# Patient Record
Sex: MALE | Race: WHITE | URBAN - METROPOLITAN AREA
[De-identification: names, ages, dates, MRNs, and addresses within clinical notes are randomized per-mention and may not be internally consistent; named-entity substitution may affect disease eponyms.]

---

## 2018-12-20 PROBLEM — S72.112A CLOSED FRACTURE OF GREATER TROCHANTER OF LEFT FEMUR (HCC): Status: ACTIVE | Noted: 2018-06-26

## 2018-12-20 PROBLEM — R07.9 CHEST PAIN: Status: ACTIVE | Noted: 2018-12-20

## 2018-12-20 PROBLEM — I25.10 CHRONIC CORONARY ARTERY DISEASE: Status: ACTIVE | Noted: 2018-12-20

## 2018-12-20 PROBLEM — Z95.5 PRESENCE OF STENT IN CORONARY ARTERY: Status: ACTIVE | Noted: 2018-04-23

## 2018-12-20 PROBLEM — E78.5 HYPERLIPIDEMIA: Status: ACTIVE | Noted: 2018-12-20

## 2018-12-20 PROBLEM — Z87.39 HX OF DEGENERATIVE DISC DISEASE: Status: ACTIVE | Noted: 2018-12-20

## 2018-12-20 PROBLEM — R00.1 BRADYCARDIA: Status: ACTIVE | Noted: 2018-01-24

## 2019-10-29 PROBLEM — I25.10 CHRONIC CORONARY ARTERY DISEASE: Status: RESOLVED | Noted: 2018-12-20 | Resolved: 2019-10-29

## 2019-10-29 PROBLEM — Z79.02 ENCOUNTER FOR CURRENT LONG TERM USE OF ANTIPLATELET DRUG: Status: ACTIVE | Noted: 2019-10-29

## 2023-09-28 ENCOUNTER — HOSPITAL ENCOUNTER (OUTPATIENT)
Age: 73
Setting detail: SPECIMEN
Discharge: HOME | End: 2023-09-28

## 2023-09-28 LAB
ALBUMIN SERPL-MCNC: 3.6 G/DL (ref 3.5–5.2)
ALBUMIN/GLOB SERPL: 1.3 {RATIO} (ref 1–2.5)
ALP SERPL-CCNC: 96 U/L (ref 40–129)
ALT SERPL-CCNC: 30 U/L (ref 5–41)
ANION GAP SERPL CALCULATED.3IONS-SCNC: 10 MMOL/L (ref 9–17)
AST SERPL-CCNC: 22 U/L
BILIRUB SERPL-MCNC: 0.4 MG/DL (ref 0.3–1.2)
BUN SERPL-MCNC: 31 MG/DL (ref 8–23)
CALCIUM SERPL-MCNC: 9 MG/DL (ref 8.6–10.4)
CHLORIDE SERPL-SCNC: 102 MMOL/L (ref 98–107)
CHOLEST SERPL-MCNC: 73 MG/DL
CHOLESTEROL/HDL RATIO: 1.8
CO2 SERPL-SCNC: 21 MMOL/L (ref 20–31)
CREAT SERPL-MCNC: 0.8 MG/DL (ref 0.7–1.2)
EST. AVERAGE GLUCOSE BLD GHB EST-MCNC: 157 MG/DL
GFR SERPL CREATININE-BSD FRML MDRD: >60 ML/MIN/1.73M2
GLUCOSE SERPL-MCNC: 135 MG/DL (ref 70–99)
HBA1C MFR BLD: 7.1 % (ref 4–6)
HDLC SERPL-MCNC: 41 MG/DL
LDLC SERPL CALC-MCNC: 21 MG/DL (ref 0–130)
POTASSIUM SERPL-SCNC: 5.3 MMOL/L (ref 3.7–5.3)
PROT SERPL-MCNC: 6.4 G/DL (ref 6.4–8.3)
SODIUM SERPL-SCNC: 133 MMOL/L (ref 135–144)
TRIGL SERPL-MCNC: 54 MG/DL

## 2023-10-07 VITALS
BODY MASS INDEX: 30.5 KG/M2 | WEIGHT: 189.8 LBS | SYSTOLIC BLOOD PRESSURE: 138 MMHG | RESPIRATION RATE: 16 BRPM | TEMPERATURE: 97.2 F | DIASTOLIC BLOOD PRESSURE: 82 MMHG | HEIGHT: 66 IN | HEART RATE: 62 BPM

## 2023-10-07 RX ORDER — CEPHALEXIN 500 MG/1
CAPSULE ORAL
COMMUNITY
Start: 2023-08-15

## 2023-10-09 ENCOUNTER — OFFICE VISIT (OUTPATIENT)
Age: 73
End: 2023-10-09

## 2023-10-09 VITALS
RESPIRATION RATE: 14 BRPM | BODY MASS INDEX: 29.95 KG/M2 | SYSTOLIC BLOOD PRESSURE: 128 MMHG | DIASTOLIC BLOOD PRESSURE: 70 MMHG | HEIGHT: 66 IN | HEART RATE: 74 BPM | TEMPERATURE: 97.6 F | WEIGHT: 186.38 LBS

## 2023-10-09 DIAGNOSIS — E78.2 MIXED HYPERLIPIDEMIA: ICD-10-CM

## 2023-10-09 DIAGNOSIS — I10 BENIGN ESSENTIAL HYPERTENSION: ICD-10-CM

## 2023-10-09 DIAGNOSIS — Z12.5 SPECIAL SCREENING FOR MALIGNANT NEOPLASM OF PROSTATE: ICD-10-CM

## 2023-10-09 DIAGNOSIS — E11.9 TYPE 2 DIABETES MELLITUS WITHOUT COMPLICATION, WITHOUT LONG-TERM CURRENT USE OF INSULIN (HCC): Primary | ICD-10-CM

## 2023-10-09 DIAGNOSIS — I25.10 CORONARY ARTERY DISEASE INVOLVING NATIVE CORONARY ARTERY OF NATIVE HEART WITHOUT ANGINA PECTORIS: ICD-10-CM

## 2023-10-09 PROBLEM — R09.81 NASAL CONGESTION: Status: ACTIVE | Noted: 2023-10-09

## 2023-10-09 PROBLEM — J32.4 CHRONIC PANSINUSITIS: Status: ACTIVE | Noted: 2023-10-09

## 2023-10-09 PROBLEM — J34.3 HYPERTROPHY OF NASAL TURBINATES: Status: ACTIVE | Noted: 2023-10-09

## 2023-10-09 RX ORDER — BUDESONIDE 0.5 MG/2ML
1 INHALANT ORAL 2 TIMES DAILY
COMMUNITY

## 2023-10-09 RX ORDER — AZELASTINE 1 MG/ML
1 SPRAY, METERED NASAL 2 TIMES DAILY
COMMUNITY

## 2023-10-09 RX ORDER — FLUTICASONE PROPIONATE 50 MCG
SPRAY, SUSPENSION (ML) NASAL
COMMUNITY

## 2023-10-09 RX ORDER — BUDESONIDE 0.5 MG/2ML
0.5 INHALANT ORAL DAILY
COMMUNITY
Start: 2023-09-05

## 2023-10-09 RX ORDER — MONTELUKAST SODIUM 10 MG/1
10 TABLET ORAL NIGHTLY
COMMUNITY

## 2023-10-09 RX ORDER — MINOCYCLINE HYDROCHLORIDE 100 MG/1
100 CAPSULE ORAL 2 TIMES DAILY
COMMUNITY

## 2023-10-09 RX ORDER — MONTELUKAST SODIUM 10 MG/1
10 TABLET ORAL DAILY
COMMUNITY
Start: 2022-01-11

## 2023-10-09 SDOH — ECONOMIC STABILITY: HOUSING INSECURITY
IN THE LAST 12 MONTHS, WAS THERE A TIME WHEN YOU DID NOT HAVE A STEADY PLACE TO SLEEP OR SLEPT IN A SHELTER (INCLUDING NOW)?: NO

## 2023-10-09 SDOH — ECONOMIC STABILITY: INCOME INSECURITY: HOW HARD IS IT FOR YOU TO PAY FOR THE VERY BASICS LIKE FOOD, HOUSING, MEDICAL CARE, AND HEATING?: NOT HARD AT ALL

## 2023-10-09 SDOH — ECONOMIC STABILITY: FOOD INSECURITY: WITHIN THE PAST 12 MONTHS, YOU WORRIED THAT YOUR FOOD WOULD RUN OUT BEFORE YOU GOT MONEY TO BUY MORE.: NEVER TRUE

## 2023-10-09 SDOH — ECONOMIC STABILITY: FOOD INSECURITY: WITHIN THE PAST 12 MONTHS, THE FOOD YOU BOUGHT JUST DIDN'T LAST AND YOU DIDN'T HAVE MONEY TO GET MORE.: NEVER TRUE

## 2023-10-09 ASSESSMENT — PATIENT HEALTH QUESTIONNAIRE - PHQ9
SUM OF ALL RESPONSES TO PHQ9 QUESTIONS 1 & 2: 0
SUM OF ALL RESPONSES TO PHQ QUESTIONS 1-9: 0
SUM OF ALL RESPONSES TO PHQ QUESTIONS 1-9: 0
1. LITTLE INTEREST OR PLEASURE IN DOING THINGS: 0
2. FEELING DOWN, DEPRESSED OR HOPELESS: 0
SUM OF ALL RESPONSES TO PHQ QUESTIONS 1-9: 0
SUM OF ALL RESPONSES TO PHQ QUESTIONS 1-9: 0

## 2023-12-11 NOTE — TELEPHONE ENCOUNTER
Laila Beltran is calling to request a refill on the following medication(s):    Medication Request:  Requested Prescriptions     Pending Prescriptions Disp Refills    metFORMIN (GLUCOPHAGE) 1000 MG tablet [Pharmacy Med Name: metFORMIN HCl Oral Tablet 1000 MG] 180 tablet 0     Sig: TAKE 1 TABLET TWICE DAILY       Last Visit Date (If Applicable):  52/5/1650    Next Visit Date:    4/15/2024

## 2024-04-05 NOTE — TELEPHONE ENCOUNTER
Nick More is calling to request a refill on the following medication(s):    Medication Request:  Requested Prescriptions     Pending Prescriptions Disp Refills    metFORMIN (GLUCOPHAGE) 1000 MG tablet [Pharmacy Med Name: metFORMIN HCl Oral Tablet 1000 MG] 180 tablet 0     Sig: TAKE ONE TABLET BY MOUTH TWICE DAILY       Last Visit Date (If Applicable):  10/9/2023    Next Visit Date:    4/15/2024

## 2024-04-08 ENCOUNTER — HOSPITAL ENCOUNTER (OUTPATIENT)
Age: 74
Setting detail: SPECIMEN
Discharge: HOME | End: 2024-04-08

## 2024-04-08 DIAGNOSIS — I10 BENIGN ESSENTIAL HYPERTENSION: ICD-10-CM

## 2024-04-08 DIAGNOSIS — Z12.5 SPECIAL SCREENING FOR MALIGNANT NEOPLASM OF PROSTATE: ICD-10-CM

## 2024-04-08 DIAGNOSIS — E78.2 MIXED HYPERLIPIDEMIA: ICD-10-CM

## 2024-04-08 DIAGNOSIS — E11.9 TYPE 2 DIABETES MELLITUS WITHOUT COMPLICATION, WITHOUT LONG-TERM CURRENT USE OF INSULIN (HCC): ICD-10-CM

## 2024-04-08 LAB
ALBUMIN SERPL-MCNC: 4.7 G/DL (ref 3.5–5.2)
ALBUMIN/GLOB SERPL: 2 {RATIO} (ref 1–2.5)
ALP SERPL-CCNC: 79 U/L (ref 40–129)
ALT SERPL-CCNC: 43 U/L (ref 10–50)
ANION GAP SERPL CALCULATED.3IONS-SCNC: 10 MMOL/L (ref 9–16)
AST SERPL-CCNC: 31 U/L (ref 10–50)
BILIRUB DIRECT SERPL-MCNC: <0.2 MG/DL (ref 0–0.3)
BILIRUB INDIRECT SERPL-MCNC: 0.2 MG/DL (ref 0–1)
BILIRUB SERPL-MCNC: 0.4 MG/DL (ref 0–1.2)
BUN SERPL-MCNC: 18 MG/DL (ref 8–23)
CALCIUM SERPL-MCNC: 10.2 MG/DL (ref 8.6–10.4)
CHLORIDE SERPL-SCNC: 101 MMOL/L (ref 98–107)
CHOLEST SERPL-MCNC: 98 MG/DL (ref 0–199)
CHOLESTEROL/HDL RATIO: 3
CO2 SERPL-SCNC: 27 MMOL/L (ref 20–31)
CREAT SERPL-MCNC: 0.9 MG/DL (ref 0.7–1.2)
EST. AVERAGE GLUCOSE BLD GHB EST-MCNC: 154 MG/DL
GFR SERPL CREATININE-BSD FRML MDRD: 88 ML/MIN/1.73M2
GLUCOSE SERPL-MCNC: 176 MG/DL (ref 74–99)
HBA1C MFR BLD: 7 % (ref 4–6)
HDLC SERPL-MCNC: 38 MG/DL
LDLC SERPL CALC-MCNC: 41 MG/DL (ref 0–100)
POTASSIUM SERPL-SCNC: 5.3 MMOL/L (ref 3.7–5.3)
PROT SERPL-MCNC: 6.9 G/DL (ref 6.6–8.7)
PSA SERPL-MCNC: 1.2 NG/ML (ref 0–4)
SODIUM SERPL-SCNC: 138 MMOL/L (ref 136–145)
TRIGL SERPL-MCNC: 92 MG/DL
VLDLC SERPL CALC-MCNC: 18 MG/DL

## 2024-04-15 ENCOUNTER — OFFICE VISIT (OUTPATIENT)
Age: 74
End: 2024-04-15
Payer: MEDICARE

## 2024-04-15 VITALS
HEIGHT: 66 IN | OXYGEN SATURATION: 97 % | DIASTOLIC BLOOD PRESSURE: 80 MMHG | WEIGHT: 191 LBS | SYSTOLIC BLOOD PRESSURE: 140 MMHG | HEART RATE: 51 BPM | TEMPERATURE: 98 F | BODY MASS INDEX: 30.7 KG/M2

## 2024-04-15 DIAGNOSIS — I10 BENIGN ESSENTIAL HYPERTENSION: ICD-10-CM

## 2024-04-15 DIAGNOSIS — J06.9 URI, ACUTE: ICD-10-CM

## 2024-04-15 DIAGNOSIS — J30.1 SEASONAL ALLERGIC RHINITIS DUE TO POLLEN: ICD-10-CM

## 2024-04-15 DIAGNOSIS — E78.2 MIXED HYPERLIPIDEMIA: ICD-10-CM

## 2024-04-15 DIAGNOSIS — E11.9 TYPE 2 DIABETES MELLITUS WITHOUT COMPLICATION, WITHOUT LONG-TERM CURRENT USE OF INSULIN (HCC): ICD-10-CM

## 2024-04-15 DIAGNOSIS — I25.10 CORONARY ARTERY DISEASE INVOLVING NATIVE CORONARY ARTERY OF NATIVE HEART WITHOUT ANGINA PECTORIS: Primary | ICD-10-CM

## 2024-04-15 PROCEDURE — 96372 THER/PROPH/DIAG INJ SC/IM: CPT | Performed by: FAMILY MEDICINE

## 2024-04-15 PROCEDURE — 3079F DIAST BP 80-89 MM HG: CPT | Performed by: FAMILY MEDICINE

## 2024-04-15 PROCEDURE — 3077F SYST BP >= 140 MM HG: CPT | Performed by: FAMILY MEDICINE

## 2024-04-15 PROCEDURE — 99213 OFFICE O/P EST LOW 20 MIN: CPT | Performed by: FAMILY MEDICINE

## 2024-04-15 PROCEDURE — 1123F ACP DISCUSS/DSCN MKR DOCD: CPT | Performed by: FAMILY MEDICINE

## 2024-04-15 PROCEDURE — 3051F HG A1C>EQUAL 7.0%<8.0%: CPT | Performed by: FAMILY MEDICINE

## 2024-04-15 RX ORDER — TRIAMCINOLONE ACETONIDE 40 MG/ML
40 INJECTION, SUSPENSION INTRA-ARTICULAR; INTRAMUSCULAR ONCE
Status: COMPLETED | OUTPATIENT
Start: 2024-04-15 | End: 2024-04-15

## 2024-04-15 RX ORDER — IBUPROFEN 200 MG
200 TABLET ORAL EVERY 6 HOURS PRN
COMMUNITY

## 2024-04-15 RX ORDER — CEPHALEXIN 500 MG/1
500 CAPSULE ORAL 3 TIMES DAILY
Qty: 30 CAPSULE | Refills: 0 | Status: SHIPPED | OUTPATIENT
Start: 2024-04-15 | End: 2024-04-25

## 2024-04-15 RX ADMIN — TRIAMCINOLONE ACETONIDE 40 MG: 40 INJECTION, SUSPENSION INTRA-ARTICULAR; INTRAMUSCULAR at 08:52

## 2024-04-15 ASSESSMENT — PATIENT HEALTH QUESTIONNAIRE - PHQ9
SUM OF ALL RESPONSES TO PHQ QUESTIONS 1-9: 0
SUM OF ALL RESPONSES TO PHQ QUESTIONS 1-9: 0
1. LITTLE INTEREST OR PLEASURE IN DOING THINGS: NOT AT ALL
SUM OF ALL RESPONSES TO PHQ QUESTIONS 1-9: 0
SUM OF ALL RESPONSES TO PHQ QUESTIONS 1-9: 0
2. FEELING DOWN, DEPRESSED OR HOPELESS: NOT AT ALL
SUM OF ALL RESPONSES TO PHQ9 QUESTIONS 1 & 2: 0

## 2024-04-15 NOTE — PROGRESS NOTES
After obtaining consent, and per orders of Dr. GWYN Mora, injection of Kenalog 40 mg given in Left deltoid by Kary Dewey. ABN was signed prior to injection, copy was given to the patient. Patient tolerated the injection well.  
hypertension  4. Mixed hyperlipidemia  5. Seasonal allergic rhinitis due to pollen  The following orders have not been finalized:  -     triamcinolone acetonide (KENALOG-40) injection 40 mg       Return here in 6 months with appropriate lab.  Kenalog 40 mg IM today.  Keflex for low-grade sinusitis.  Continue Flonase Zyrtec    COMMUNICATION:       Electronically signed by Harsh Mora MD on 4/15/2024 at 8:42 AM    Portion of this note were dictated using Dragon speech recognition software. It has been reviewed for accuracy, but may contain grammatical and clerical errors.

## 2024-08-08 RX ORDER — LISINOPRIL 2.5 MG/1
2.5 TABLET ORAL DAILY
Qty: 90 TABLET | Refills: 3 | Status: SHIPPED | OUTPATIENT
Start: 2024-08-08

## 2024-08-08 NOTE — TELEPHONE ENCOUNTER
Nick More is calling to request a refill on the following medication(s):    Medication Request:  Requested Prescriptions     Pending Prescriptions Disp Refills    lisinopril (PRINIVIL;ZESTRIL) 2.5 MG tablet [Pharmacy Med Name: Lisinopril Oral Tablet 2.5 MG] 90 tablet 0     Sig: TAKE ONE TABLET BY MOUTH ONE TIME DAILY       Last Visit Date (If Applicable):  4/15/2024    Next Visit Date:    10/16/2024

## 2024-10-10 ENCOUNTER — HOSPITAL ENCOUNTER (OUTPATIENT)
Age: 74
Setting detail: SPECIMEN
Discharge: HOME | End: 2024-10-10

## 2024-10-10 DIAGNOSIS — E11.9 TYPE 2 DIABETES MELLITUS WITHOUT COMPLICATION, WITHOUT LONG-TERM CURRENT USE OF INSULIN (HCC): ICD-10-CM

## 2024-10-10 DIAGNOSIS — I25.10 CORONARY ARTERY DISEASE INVOLVING NATIVE CORONARY ARTERY OF NATIVE HEART WITHOUT ANGINA PECTORIS: ICD-10-CM

## 2024-10-10 DIAGNOSIS — E78.2 MIXED HYPERLIPIDEMIA: ICD-10-CM

## 2024-10-10 DIAGNOSIS — I10 BENIGN ESSENTIAL HYPERTENSION: ICD-10-CM

## 2024-10-10 LAB
ALBUMIN SERPL-MCNC: 4.6 G/DL (ref 3.5–5.2)
ALBUMIN/GLOB SERPL: 2 {RATIO} (ref 1–2.5)
ALP SERPL-CCNC: 88 U/L (ref 40–129)
ALT SERPL-CCNC: 37 U/L (ref 10–50)
ANION GAP SERPL CALCULATED.3IONS-SCNC: 8 MMOL/L (ref 9–16)
AST SERPL-CCNC: 23 U/L (ref 10–50)
BILIRUB DIRECT SERPL-MCNC: 0.2 MG/DL (ref 0–0.2)
BILIRUB INDIRECT SERPL-MCNC: 0.2 MG/DL (ref 0–1)
BILIRUB SERPL-MCNC: 0.4 MG/DL (ref 0–1.2)
BUN SERPL-MCNC: 20 MG/DL (ref 8–23)
CALCIUM SERPL-MCNC: 10 MG/DL (ref 8.6–10.4)
CHLORIDE SERPL-SCNC: 102 MMOL/L (ref 98–107)
CHOLEST SERPL-MCNC: 105 MG/DL (ref 0–199)
CHOLESTEROL/HDL RATIO: 3
CO2 SERPL-SCNC: 26 MMOL/L (ref 20–31)
CREAT SERPL-MCNC: 0.8 MG/DL (ref 0.7–1.2)
EST. AVERAGE GLUCOSE BLD GHB EST-MCNC: 171 MG/DL
GFR, ESTIMATED: >90 ML/MIN/1.73M2
GLUCOSE SERPL-MCNC: 206 MG/DL (ref 74–99)
HBA1C MFR BLD: 7.6 % (ref 4–6)
HDLC SERPL-MCNC: 41 MG/DL
LDLC SERPL CALC-MCNC: 45 MG/DL (ref 0–100)
POTASSIUM SERPL-SCNC: 5 MMOL/L (ref 3.7–5.3)
PROT SERPL-MCNC: 6.9 G/DL (ref 6.6–8.7)
SODIUM SERPL-SCNC: 136 MMOL/L (ref 136–145)
TRIGL SERPL-MCNC: 94 MG/DL
VLDLC SERPL CALC-MCNC: 19 MG/DL

## 2024-10-16 ENCOUNTER — OFFICE VISIT (OUTPATIENT)
Age: 74
End: 2024-10-16

## 2024-10-16 VITALS
HEIGHT: 66 IN | WEIGHT: 187 LBS | HEART RATE: 84 BPM | BODY MASS INDEX: 30.05 KG/M2 | DIASTOLIC BLOOD PRESSURE: 86 MMHG | OXYGEN SATURATION: 97 % | SYSTOLIC BLOOD PRESSURE: 128 MMHG | TEMPERATURE: 97.3 F

## 2024-10-16 DIAGNOSIS — Z00.00 INITIAL MEDICARE ANNUAL WELLNESS VISIT: Primary | ICD-10-CM

## 2024-10-16 DIAGNOSIS — E11.9 TYPE 2 DIABETES MELLITUS WITHOUT COMPLICATION, WITHOUT LONG-TERM CURRENT USE OF INSULIN (HCC): ICD-10-CM

## 2024-10-16 DIAGNOSIS — Z95.5 PRESENCE OF STENT IN CORONARY ARTERY: ICD-10-CM

## 2024-10-16 DIAGNOSIS — I10 BENIGN ESSENTIAL HYPERTENSION: ICD-10-CM

## 2024-10-16 DIAGNOSIS — E78.2 MIXED HYPERLIPIDEMIA: ICD-10-CM

## 2024-10-16 DIAGNOSIS — I25.10 CORONARY ARTERY DISEASE INVOLVING NATIVE CORONARY ARTERY OF NATIVE HEART WITHOUT ANGINA PECTORIS: ICD-10-CM

## 2024-10-16 DIAGNOSIS — I10 PRIMARY HYPERTENSION: ICD-10-CM

## 2024-10-16 SDOH — ECONOMIC STABILITY: FOOD INSECURITY: WITHIN THE PAST 12 MONTHS, THE FOOD YOU BOUGHT JUST DIDN'T LAST AND YOU DIDN'T HAVE MONEY TO GET MORE.: NEVER TRUE

## 2024-10-16 SDOH — ECONOMIC STABILITY: FOOD INSECURITY: WITHIN THE PAST 12 MONTHS, YOU WORRIED THAT YOUR FOOD WOULD RUN OUT BEFORE YOU GOT MONEY TO BUY MORE.: NEVER TRUE

## 2024-10-16 SDOH — ECONOMIC STABILITY: INCOME INSECURITY: HOW HARD IS IT FOR YOU TO PAY FOR THE VERY BASICS LIKE FOOD, HOUSING, MEDICAL CARE, AND HEATING?: NOT HARD AT ALL

## 2024-10-16 ASSESSMENT — PATIENT HEALTH QUESTIONNAIRE - PHQ9
SUM OF ALL RESPONSES TO PHQ9 QUESTIONS 1 & 2: 0
SUM OF ALL RESPONSES TO PHQ QUESTIONS 1-9: 0
1. LITTLE INTEREST OR PLEASURE IN DOING THINGS: NOT AT ALL
SUM OF ALL RESPONSES TO PHQ QUESTIONS 1-9: 0
2. FEELING DOWN, DEPRESSED OR HOPELESS: NOT AT ALL
SUM OF ALL RESPONSES TO PHQ QUESTIONS 1-9: 0
SUM OF ALL RESPONSES TO PHQ QUESTIONS 1-9: 0

## 2024-10-16 NOTE — PATIENT INSTRUCTIONS
secondhand smoke too.     Stay at a weight that's healthy for you. Talk to your doctor if you need help losing weight.     Try to get 7 to 9 hours of sleep each night.     Limit alcohol to 2 drinks a day for men and 1 drink a day for women. Too much alcohol can cause health problems.     Manage other health problems such as diabetes, high blood pressure, and high cholesterol. If you think you may have a problem with alcohol or drug use, talk to your doctor.   Medicines    Take your medicines exactly as prescribed. Call your doctor if you think you are having a problem with your medicine.     If your doctor recommends aspirin, take the amount directed each day. Make sure you take aspirin and not another kind of pain reliever, such as acetaminophen (Tylenol).   When should you call for help?   Call 911 if you have symptoms of a heart attack. These may include:    Chest pain or pressure, or a strange feeling in the chest.     Sweating.     Shortness of breath.     Pain, pressure, or a strange feeling in the back, neck, jaw, or upper belly or in one or both shoulders or arms.     Lightheadedness or sudden weakness.     A fast or irregular heartbeat.   After you call 911, the  may tell you to chew 1 adult-strength or 2 to 4 low-dose aspirin. Wait for an ambulance. Do not try to drive yourself.  Watch closely for changes in your health, and be sure to contact your doctor if you have any problems.  Where can you learn more?  Go to https://www.[employer].net/patientEd and enter F075 to learn more about \"A Healthy Heart: Care Instructions.\"  Current as of: June 24, 2023  Content Version: 14.2  © 2024 [employer].   Care instructions adapted under license by [employer]. If you have questions about a medical condition or this instruction, always ask your healthcare professional. [employer]wise[employer] disclaims any warranty or liability for your use of this information.      Personalized Preventive Plan

## 2024-10-16 NOTE — PROGRESS NOTES
Medicare Annual Wellness Visit    Nick More is here for Medicare AWV (AWV and 6 month check up.  Having issues with bowel movements - having to go a second time, usually diarrhea after formed stool.)    Assessment & Plan   Initial Medicare annual wellness visit  Type 2 diabetes mellitus without complication, without long-term current use of insulin (HCC)  -     Comprehensive Metabolic Panel with Bilirubin; Future  -     Lipid Panel; Future  -     Hemoglobin A1C; Future  Coronary artery disease involving native coronary artery of native heart without angina pectoris  -     Comprehensive Metabolic Panel with Bilirubin; Future  -     Lipid Panel; Future  -     Hemoglobin A1C; Future  Benign essential hypertension  Mixed hyperlipidemia  -     Comprehensive Metabolic Panel with Bilirubin; Future  -     Lipid Panel; Future  -     Hemoglobin A1C; Future  Presence of stent in coronary artery  Primary hypertension  Recommendations for Preventive Services Due: see orders and patient instructions/AVS.  Recommended screening schedule for the next 5-10 years is provided to the patient in written form: see Patient Instructions/AVS.     Return in about 6 months (around 4/16/2025).  Patient will get COVID and flu vaccine at his pharmacy.  He continues to see cardiology on a regular basis and recently had good report.  Return here in 6 months.  He is up-to-date on PSAs and colonoscopies.  Diet was discussed.  Labs were reviewed A1c drifted up a little bit he will cut back on concentrated sweets     Subjective       Patient's complete Health Risk Assessment and screening values have been reviewed and are found in Flowsheets. The following problems were reviewed today and where indicated follow up appointments were made and/or referrals ordered.    Positive Risk Factor Screenings with Interventions:                  Abnormal BMI (obese):  Body mass index is 30.18 kg/m². (!) Abnormal  Interventions:  Patient is actively trying

## 2024-12-24 ENCOUNTER — TELEPHONE (OUTPATIENT)
Dept: PHARMACY | Facility: CLINIC | Age: 74
End: 2024-12-24

## 2024-12-24 NOTE — TELEPHONE ENCOUNTER
[facility] CLINICAL PHARMACY: ADHERENCE REVIEW  Identified care gap per [employer]: fills at [facility] : Statin adherence    Patient also appears to be prescribed: ACE/ARB and Diabetes (ACEi 82% PDC in , 98% PDC YTD and passed measure year; DM 83% PDC in , 95% PDC YTD and passed measure year)    ASSESSMENT  STATIN ADHERENCE    Insurance Records claims through 24 (Prior Year PDC = 100% - PASSED ; YTD PDC = 83%; Potential Fail Date: 24):   ATORVASTATIN TAB 40MG last filled on 24 for 90 day supply. Next refill due: 12/10/24    Prescribed si tablet/capsule daily    Per Reconcile Dispense History and Insurer Portal: last filled on 24 for 90 day supply. (Also clopidogrel, lisinopril refilled  and mupirocin & budesonide refilled 12/10/24)    Per [facility] automated:  no refills currently ready to     Lab Results   Component Value Date    CHOL 105 10/10/2024    TRIG 94 10/10/2024    HDL 41 10/10/2024     Lab Results   Component Value Date    LDL 45 10/10/2024      ALT   Date Value Ref Range Status   10/10/2024 37 10 - 50 U/L Final     AST   Date Value Ref Range Status   10/10/2024 23 10 - 50 U/L Final     The ASCVD Risk score (Toyin DK, et al., 2019) failed to calculate for the following reasons:    The valid total cholesterol range is 130 to 320 mg/dL     PLAN  No patient outreach planned at this time. Appears timely refill picked up and passed adherence measure year again this year.    Last Visit: 10/16/24  Next Visit: 25    Cindi Culp PharmD, BCACP  Population Health Pharmacist  [facility] Clinical Pharmacy  Department, toll free: [phone], option 1     =======================================================   For Pharmacy Admin Tracking Only    Program: Multichannel  CPA in place:  No  Gap Closed?: No   Time Spent (min): 10

## 2025-02-06 NOTE — TELEPHONE ENCOUNTER
Nick More is calling to request a refill on the following medication(s):    Medication Request:  Requested Prescriptions     Pending Prescriptions Disp Refills    metFORMIN (GLUCOPHAGE) 1000 MG tablet [Pharmacy Med Name: metFORMIN HCl Oral Tablet 1000 MG] 180 tablet 0     Sig: TAKE ONE TABLET BY MOUTH TWICE DAILY       Last Visit Date (If Applicable):  10/16/2024    Next Visit Date:    4/25/2025

## 2025-04-29 ENCOUNTER — HOSPITAL ENCOUNTER (OUTPATIENT)
Age: 75
Setting detail: SPECIMEN
Discharge: HOME | End: 2025-04-29

## 2025-04-29 DIAGNOSIS — I25.10 CORONARY ARTERY DISEASE INVOLVING NATIVE CORONARY ARTERY OF NATIVE HEART WITHOUT ANGINA PECTORIS: ICD-10-CM

## 2025-04-29 DIAGNOSIS — E78.2 MIXED HYPERLIPIDEMIA: ICD-10-CM

## 2025-04-29 DIAGNOSIS — E11.9 TYPE 2 DIABETES MELLITUS WITHOUT COMPLICATION, WITHOUT LONG-TERM CURRENT USE OF INSULIN (HCC): ICD-10-CM

## 2025-04-29 LAB
ALBUMIN SERPL-MCNC: 4.1 G/DL (ref 3.5–5.2)
ALBUMIN/GLOB SERPL: 1.6 {RATIO} (ref 1–2.5)
ALP SERPL-CCNC: 75 U/L (ref 40–129)
ALT SERPL-CCNC: 25 U/L (ref 10–50)
ANION GAP SERPL CALCULATED.3IONS-SCNC: 8 MMOL/L (ref 9–16)
AST SERPL-CCNC: 18 U/L (ref 10–50)
BILIRUB DIRECT SERPL-MCNC: 0.2 MG/DL (ref 0–0.2)
BILIRUB INDIRECT SERPL-MCNC: 0.2 MG/DL (ref 0–1)
BILIRUB SERPL-MCNC: 0.4 MG/DL (ref 0–1.2)
BUN SERPL-MCNC: 20 MG/DL (ref 8–23)
CALCIUM SERPL-MCNC: 9.7 MG/DL (ref 8.6–10.4)
CHLORIDE SERPL-SCNC: 106 MMOL/L (ref 98–107)
CHOLEST SERPL-MCNC: 86 MG/DL (ref 0–199)
CHOLESTEROL/HDL RATIO: 2.1
CO2 SERPL-SCNC: 25 MMOL/L (ref 20–31)
CREAT SERPL-MCNC: 0.9 MG/DL (ref 0.7–1.2)
EST. AVERAGE GLUCOSE BLD GHB EST-MCNC: 146 MG/DL
GFR, ESTIMATED: 89 ML/MIN/1.73M2
GLUCOSE SERPL-MCNC: 134 MG/DL (ref 74–99)
HBA1C MFR BLD: 6.7 % (ref 4–6)
HDLC SERPL-MCNC: 41 MG/DL
LDLC SERPL CALC-MCNC: 35 MG/DL (ref 0–100)
POTASSIUM SERPL-SCNC: 4.9 MMOL/L (ref 3.7–5.3)
PROT SERPL-MCNC: 6.6 G/DL (ref 6.6–8.7)
SODIUM SERPL-SCNC: 139 MMOL/L (ref 136–145)
TRIGL SERPL-MCNC: 48 MG/DL
VLDLC SERPL CALC-MCNC: 10 MG/DL (ref 1–30)

## 2025-05-05 NOTE — TELEPHONE ENCOUNTER
Nick More is calling to request a refill on the following medication(s):    Medication Request:  Requested Prescriptions     Pending Prescriptions Disp Refills    metFORMIN (GLUCOPHAGE) 1000 MG tablet [Pharmacy Med Name: metFORMIN HCl Oral Tablet 1000 MG] 180 tablet 0     Sig: TAKE ONE TABLET BY MOUTH TWICE DAILY       Last Visit Date (If Applicable):  10/16/2024    Next Visit Date:    5/12/2025

## 2025-05-09 NOTE — PATIENT INSTRUCTIONS
Nick    Thank you for choosing [facility].  We know you have options when it comes to your healthcare; we appreciate that you chose us. Our goal is to provide exceptional  service and world class care to every patient.  You will be receiving a survey via email or text message asking for your feedback.  Please take a few minutes to share your thoughts about your recent visit. Your comments help us understand what we do well and ways we can improve.  Thank you in advance for your valuable feedback.      Dr. GWYN Dewey

## 2025-05-12 ENCOUNTER — TELEPHONE (OUTPATIENT)
Age: 75
End: 2025-05-12

## 2025-05-12 ENCOUNTER — OFFICE VISIT (OUTPATIENT)
Age: 75
End: 2025-05-12
Payer: MEDICARE

## 2025-05-12 VITALS
DIASTOLIC BLOOD PRESSURE: 72 MMHG | SYSTOLIC BLOOD PRESSURE: 128 MMHG | BODY MASS INDEX: 29.32 KG/M2 | OXYGEN SATURATION: 97 % | WEIGHT: 182.4 LBS | TEMPERATURE: 97.5 F | HEART RATE: 56 BPM | HEIGHT: 66 IN

## 2025-05-12 DIAGNOSIS — E11.9 TYPE 2 DIABETES MELLITUS WITHOUT COMPLICATION, WITHOUT LONG-TERM CURRENT USE OF INSULIN (HCC): Primary | ICD-10-CM

## 2025-05-12 DIAGNOSIS — I10 PRIMARY HYPERTENSION: ICD-10-CM

## 2025-05-12 DIAGNOSIS — I10 BENIGN ESSENTIAL HYPERTENSION: ICD-10-CM

## 2025-05-12 DIAGNOSIS — Z95.5 PRESENCE OF STENT IN CORONARY ARTERY: ICD-10-CM

## 2025-05-12 DIAGNOSIS — Z12.5 PROSTATE CANCER SCREENING: ICD-10-CM

## 2025-05-12 DIAGNOSIS — I25.10 CORONARY ARTERY DISEASE INVOLVING NATIVE CORONARY ARTERY OF NATIVE HEART WITHOUT ANGINA PECTORIS: ICD-10-CM

## 2025-05-12 DIAGNOSIS — E78.2 MIXED HYPERLIPIDEMIA: ICD-10-CM

## 2025-05-12 PROCEDURE — 1159F MED LIST DOCD IN RCRD: CPT | Performed by: FAMILY MEDICINE

## 2025-05-12 PROCEDURE — 3074F SYST BP LT 130 MM HG: CPT | Performed by: FAMILY MEDICINE

## 2025-05-12 PROCEDURE — 1123F ACP DISCUSS/DSCN MKR DOCD: CPT | Performed by: FAMILY MEDICINE

## 2025-05-12 PROCEDURE — 3078F DIAST BP <80 MM HG: CPT | Performed by: FAMILY MEDICINE

## 2025-05-12 PROCEDURE — 99213 OFFICE O/P EST LOW 20 MIN: CPT | Performed by: FAMILY MEDICINE

## 2025-05-12 PROCEDURE — 3044F HG A1C LEVEL LT 7.0%: CPT | Performed by: FAMILY MEDICINE

## 2025-05-12 RX ORDER — BUDESONIDE 0.5 MG/2ML
0.5 INHALANT ORAL DAILY
COMMUNITY
Start: 2024-12-10

## 2025-05-12 SDOH — ECONOMIC STABILITY: FOOD INSECURITY: WITHIN THE PAST 12 MONTHS, THE FOOD YOU BOUGHT JUST DIDN'T LAST AND YOU DIDN'T HAVE MONEY TO GET MORE.: NEVER TRUE

## 2025-05-12 SDOH — ECONOMIC STABILITY: FOOD INSECURITY: WITHIN THE PAST 12 MONTHS, YOU WORRIED THAT YOUR FOOD WOULD RUN OUT BEFORE YOU GOT MONEY TO BUY MORE.: NEVER TRUE

## 2025-05-12 ASSESSMENT — PATIENT HEALTH QUESTIONNAIRE - PHQ9
SUM OF ALL RESPONSES TO PHQ QUESTIONS 1-9: 0
1. LITTLE INTEREST OR PLEASURE IN DOING THINGS: NOT AT ALL
2. FEELING DOWN, DEPRESSED OR HOPELESS: NOT AT ALL
SUM OF ALL RESPONSES TO PHQ QUESTIONS 1-9: 0

## 2025-05-12 ASSESSMENT — LIFESTYLE VARIABLES
HOW MANY STANDARD DRINKS CONTAINING ALCOHOL DO YOU HAVE ON A TYPICAL DAY: 1 OR 2
HOW OFTEN DO YOU HAVE A DRINK CONTAINING ALCOHOL: 2-4 TIMES A MONTH

## 2025-05-12 NOTE — PROGRESS NOTES
[facility]     Date of Visit:  2025  Patient Name: Nick More   Patient :  1950     CHIEF COMPLAINT/HPI:     Nick More is a 74 y.o. male who presents today for an general visit to be evaluated for the following condition(s):  Chief Complaint   Patient presents with    Medicare AWV     Care teams reviewed. Labs done on .     6 Month Follow-Up     This weekend very fatigued with body aches. Discuss when getting up from laying or sitting feels unsteady increasing over the last few months.      Patient has noticed achiness of the muscles after he has been doing more yard work.  He is back working full-time for the [employer].  He is working from 8-4 5 days a week.  No chest pain no shortness of breath no nausea no vomiting no diarrhea    Hemoglobin A1C   Date Value Ref Range Status   2025 6.7 (H) 4.0 - 6.0 % Final   10/10/2024 7.6 (H) 4.0 - 6.0 % Final   2024 7.0 (H) 4.0 - 6.0 % Final       Lab Results   Component Value Date/Time     2025 07:31 AM    K 4.9 2025 07:31 AM     2025 07:31 AM    CO2 25 2025 07:31 AM    BUN 20 2025 07:31 AM    CREATININE 0.9 2025 07:31 AM    GLUCOSE 134 2025 07:31 AM    CALCIUM 9.7 2025 07:31 AM    LABGLOM 89 2025 07:31 AM    LABGLOM 88 2024 07:32 AM            Lab Results   Component Value Date    CHOL 86 2025    TRIG 48 2025    HDL 41 2025    VLDL 10 2025    CHOLHDLRATIO 2.1 2025          Lab Results   Component Value Date    ALT 25 2025    AST 18 2025    ALKPHOS 75 2025    BILITOT 0.4 2025             REVIEW OF SYSTEM      Review of Systems  Patient has no other healthcare issues.  No fever no sweats no chills. No chest pain nor shortness of breath. No nausea nor vomiting no diarrhea . No  complaints.  No edema issues.      REVIEWED INFORMATION      Allergies   Allergen Reactions    No Known Allergies

## 2025-05-12 NOTE — TELEPHONE ENCOUNTER
Please call patient after the office exam today I noticed a couple single extra heartbeats which is usually from the top part of the heart and are just normal variants.  However after he left I could not find a recent EKG to confirm this.  Therefore I would like him to get a EKG sometime in the near future within a week or so and we can schedule that here with a nurse as a in and out visit

## 2025-05-15 NOTE — TELEPHONE ENCOUNTER
Spoke to patient and he verbalized understanding. Asked since he has an appt with cardiology 8/1/25 woul dit be okay to do it then advised would be better to get one soon. Scheduled for 5/20 9am.

## 2025-05-20 ENCOUNTER — TELEPHONE (OUTPATIENT)
Age: 75
End: 2025-05-20

## 2025-05-20 ENCOUNTER — RESULTS FOLLOW-UP (OUTPATIENT)
Age: 75
End: 2025-05-20

## 2025-05-20 ENCOUNTER — CLINICAL SUPPORT (OUTPATIENT)
Age: 75
End: 2025-05-20
Payer: MEDICARE

## 2025-05-20 DIAGNOSIS — I49.3 PVC (PREMATURE VENTRICULAR CONTRACTION): Primary | ICD-10-CM

## 2025-05-20 PROCEDURE — 93000 ELECTROCARDIOGRAM COMPLETE: CPT | Performed by: FAMILY MEDICINE

## 2025-05-20 PROCEDURE — 99999 PR OFFICE/OUTPT VISIT,PROCEDURE ONLY: CPT | Performed by: FAMILY MEDICINE

## 2025-05-20 NOTE — PROGRESS NOTES
Per Dr. Mora order, EKG was performed and patient tolerated the procedure well.     EKG: frequent PVC's noted.

## 2025-05-20 NOTE — TELEPHONE ENCOUNTER
Patient came in for his EKG. PVCs were noted. NB looked over the EKG this morning when patient was in the office.    I also got a old EKG from 07/2024 from [facility]. All placed on your desk.

## 2025-05-20 NOTE — RESULT ENCOUNTER NOTE
EKG today shows sinus bradycardia with single PVC in bigeminal fashion.  Heart rate 57 poor R wave progression suggestive of previous infarct.  This was present in an old tracings.  PVCs are new and bradycardia is no phone call the patient tonight he will stop Coreg and stop here for nurse appointment in a week for blood pressure and pulse check    Please call patient to set up nurse visit next week for blood pressure and pulse

## 2025-06-02 ENCOUNTER — HOSPITAL ENCOUNTER (INPATIENT)
Age: 75
LOS: 2 days | Discharge: HOME | DRG: 287 | End: 2025-06-04
Attending: EMERGENCY MEDICINE | Admitting: FAMILY MEDICINE
Payer: MEDICARE

## 2025-06-02 ENCOUNTER — APPOINTMENT (OUTPATIENT)
Dept: GENERAL RADIOLOGY | Age: 75
DRG: 287 | End: 2025-06-02
Payer: MEDICARE

## 2025-06-02 ENCOUNTER — CLINICAL SUPPORT (OUTPATIENT)
Age: 75
End: 2025-06-02

## 2025-06-02 VITALS — SYSTOLIC BLOOD PRESSURE: 142 MMHG | HEART RATE: 30 BPM | OXYGEN SATURATION: 96 % | DIASTOLIC BLOOD PRESSURE: 62 MMHG

## 2025-06-02 DIAGNOSIS — E11.9 TYPE 2 DIABETES MELLITUS WITHOUT COMPLICATION, WITHOUT LONG-TERM CURRENT USE OF INSULIN (HCC): ICD-10-CM

## 2025-06-02 DIAGNOSIS — R00.2 PALPITATIONS: ICD-10-CM

## 2025-06-02 DIAGNOSIS — I49.3 PVC (PREMATURE VENTRICULAR CONTRACTION): ICD-10-CM

## 2025-06-02 DIAGNOSIS — R00.1 BRADYCARDIA: Primary | ICD-10-CM

## 2025-06-02 DIAGNOSIS — I25.10 CORONARY ARTERY DISEASE INVOLVING NATIVE CORONARY ARTERY OF NATIVE HEART WITHOUT ANGINA PECTORIS: ICD-10-CM

## 2025-06-02 DIAGNOSIS — R94.39 ABNORMAL STRESS TEST: ICD-10-CM

## 2025-06-02 DIAGNOSIS — R94.31 ABNORMAL ELECTROCARDIOGRAPHY: ICD-10-CM

## 2025-06-02 DIAGNOSIS — R06.02 SHORTNESS OF BREATH: ICD-10-CM

## 2025-06-02 LAB
ANION GAP SERPL CALCULATED.3IONS-SCNC: 12 MMOL/L (ref 9–16)
BASOPHILS # BLD: 0 K/UL (ref 0–0.2)
BASOPHILS NFR BLD: 1 % (ref 0–2)
BILIRUB UR QL STRIP: NEGATIVE
BUN SERPL-MCNC: 25 MG/DL (ref 8–23)
CALCIUM SERPL-MCNC: 9.8 MG/DL (ref 8.6–10.4)
CHLORIDE SERPL-SCNC: 104 MMOL/L (ref 98–107)
CLARITY UR: CLEAR
CO2 SERPL-SCNC: 22 MMOL/L (ref 20–31)
COLOR UR: YELLOW
COMMENT: ABNORMAL
CREAT SERPL-MCNC: 0.9 MG/DL (ref 0.7–1.2)
EOSINOPHIL # BLD: 0.1 K/UL (ref 0–0.4)
EOSINOPHILS RELATIVE PERCENT: 3 % (ref 1–4)
ERYTHROCYTE [DISTWIDTH] IN BLOOD BY AUTOMATED COUNT: 14.9 % (ref 12.5–15.4)
GFR, ESTIMATED: 89 ML/MIN/1.73M2
GLUCOSE BLD-MCNC: 133 MG/DL (ref 75–110)
GLUCOSE SERPL-MCNC: 111 MG/DL (ref 74–99)
GLUCOSE UR STRIP-MCNC: NEGATIVE MG/DL
HCT VFR BLD AUTO: 37.8 % (ref 41–53)
HGB BLD-MCNC: 12.7 G/DL (ref 13.5–17.5)
HGB UR QL STRIP.AUTO: NEGATIVE
KETONES UR STRIP-MCNC: NEGATIVE MG/DL
LEUKOCYTE ESTERASE UR QL STRIP: NEGATIVE
LYMPHOCYTES NFR BLD: 0.8 K/UL (ref 1–4.8)
LYMPHOCYTES RELATIVE PERCENT: 19 % (ref 24–44)
MAGNESIUM SERPL-MCNC: 1.9 MG/DL (ref 1.6–2.4)
MCH RBC QN AUTO: 33.3 PG (ref 26–34)
MCHC RBC AUTO-ENTMCNC: 33.7 G/DL (ref 31–37)
MCV RBC AUTO: 98.8 FL (ref 80–100)
MONOCYTES NFR BLD: 0.4 K/UL (ref 0.1–1.2)
MONOCYTES NFR BLD: 9 % (ref 2–11)
NEUTROPHILS NFR BLD: 68 % (ref 36–66)
NEUTS SEG NFR BLD: 2.9 K/UL (ref 1.8–7.7)
NITRITE UR QL STRIP: NEGATIVE
PH UR STRIP: 6 [PH] (ref 5–8)
PLATELET # BLD AUTO: 156 K/UL (ref 140–450)
PMV BLD AUTO: 8.4 FL (ref 6–12)
POTASSIUM SERPL-SCNC: 4.2 MMOL/L (ref 3.7–5.3)
PROT UR STRIP-MCNC: NEGATIVE MG/DL
RBC # BLD AUTO: 3.82 M/UL (ref 4.5–5.9)
SODIUM SERPL-SCNC: 138 MMOL/L (ref 136–145)
SP GR UR STRIP: <1.005 (ref 1–1.03)
T4 FREE SERPL-MCNC: 1 NG/DL (ref 0.92–1.68)
TROPONIN I SERPL HS-MCNC: 37 NG/L (ref 0–22)
TSH SERPL DL<=0.05 MIU/L-ACNC: 1.11 UIU/ML (ref 0.27–4.2)
UROBILINOGEN UR STRIP-ACNC: NORMAL EU/DL (ref 0–1)
WBC OTHER # BLD: 4.2 K/UL (ref 3.5–11)

## 2025-06-02 PROCEDURE — 84443 ASSAY THYROID STIM HORMONE: CPT

## 2025-06-02 PROCEDURE — 84484 ASSAY OF TROPONIN QUANT: CPT

## 2025-06-02 PROCEDURE — 81003 URINALYSIS AUTO W/O SCOPE: CPT

## 2025-06-02 PROCEDURE — 36415 COLL VENOUS BLD VENIPUNCTURE: CPT

## 2025-06-02 PROCEDURE — 93005 ELECTROCARDIOGRAM TRACING: CPT | Performed by: NURSE PRACTITIONER

## 2025-06-02 PROCEDURE — 80048 BASIC METABOLIC PNL TOTAL CA: CPT

## 2025-06-02 PROCEDURE — 82947 ASSAY GLUCOSE BLOOD QUANT: CPT

## 2025-06-02 PROCEDURE — 99285 EMERGENCY DEPT VISIT HI MDM: CPT

## 2025-06-02 PROCEDURE — 6360000002 HC RX W HCPCS: Performed by: FAMILY MEDICINE

## 2025-06-02 PROCEDURE — 6370000000 HC RX 637 (ALT 250 FOR IP): Performed by: FAMILY MEDICINE

## 2025-06-02 PROCEDURE — 2000000000 HC ICU R&B

## 2025-06-02 PROCEDURE — 71045 X-RAY EXAM CHEST 1 VIEW: CPT

## 2025-06-02 PROCEDURE — 83735 ASSAY OF MAGNESIUM: CPT

## 2025-06-02 PROCEDURE — 85025 COMPLETE CBC W/AUTO DIFF WBC: CPT

## 2025-06-02 PROCEDURE — 84439 ASSAY OF FREE THYROXINE: CPT

## 2025-06-02 RX ORDER — POTASSIUM CHLORIDE 1500 MG/1
40 TABLET, EXTENDED RELEASE ORAL PRN
Status: DISCONTINUED | OUTPATIENT
Start: 2025-06-02 | End: 2025-06-04 | Stop reason: HOSPADM

## 2025-06-02 RX ORDER — SODIUM CHLORIDE 0.9 % (FLUSH) 0.9 %
5-40 SYRINGE (ML) INJECTION PRN
Status: DISCONTINUED | OUTPATIENT
Start: 2025-06-02 | End: 2025-06-04 | Stop reason: HOSPADM

## 2025-06-02 RX ORDER — AZELASTINE 1 MG/ML
1 SPRAY, METERED NASAL 2 TIMES DAILY PRN
Status: DISCONTINUED | OUTPATIENT
Start: 2025-06-02 | End: 2025-06-04 | Stop reason: HOSPADM

## 2025-06-02 RX ORDER — CLOPIDOGREL BISULFATE 75 MG/1
75 TABLET ORAL DAILY
Status: DISCONTINUED | OUTPATIENT
Start: 2025-06-02 | End: 2025-06-04 | Stop reason: HOSPADM

## 2025-06-02 RX ORDER — MAGNESIUM SULFATE IN WATER 40 MG/ML
2000 INJECTION, SOLUTION INTRAVENOUS PRN
Status: DISCONTINUED | OUTPATIENT
Start: 2025-06-02 | End: 2025-06-04 | Stop reason: HOSPADM

## 2025-06-02 RX ORDER — ACETAMINOPHEN 325 MG/1
650 TABLET ORAL EVERY 6 HOURS PRN
Status: DISCONTINUED | OUTPATIENT
Start: 2025-06-02 | End: 2025-06-04 | Stop reason: SDUPTHER

## 2025-06-02 RX ORDER — ONDANSETRON 2 MG/ML
4 INJECTION INTRAMUSCULAR; INTRAVENOUS EVERY 6 HOURS PRN
Status: DISCONTINUED | OUTPATIENT
Start: 2025-06-02 | End: 2025-06-04 | Stop reason: HOSPADM

## 2025-06-02 RX ORDER — POLYETHYLENE GLYCOL 3350 17 G/17G
17 POWDER, FOR SOLUTION ORAL DAILY PRN
Status: DISCONTINUED | OUTPATIENT
Start: 2025-06-02 | End: 2025-06-04 | Stop reason: HOSPADM

## 2025-06-02 RX ORDER — ENOXAPARIN SODIUM 100 MG/ML
40 INJECTION SUBCUTANEOUS DAILY
Status: DISCONTINUED | OUTPATIENT
Start: 2025-06-02 | End: 2025-06-04 | Stop reason: HOSPADM

## 2025-06-02 RX ORDER — SODIUM CHLORIDE 0.9 % (FLUSH) 0.9 %
5-40 SYRINGE (ML) INJECTION EVERY 12 HOURS SCHEDULED
Status: DISCONTINUED | OUTPATIENT
Start: 2025-06-02 | End: 2025-06-02

## 2025-06-02 RX ORDER — BUDESONIDE 0.5 MG/2ML
0.5 INHALANT ORAL DAILY
Status: DISCONTINUED | OUTPATIENT
Start: 2025-06-02 | End: 2025-06-04 | Stop reason: HOSPADM

## 2025-06-02 RX ORDER — SODIUM CHLORIDE 9 MG/ML
INJECTION, SOLUTION INTRAVENOUS PRN
Status: DISCONTINUED | OUTPATIENT
Start: 2025-06-02 | End: 2025-06-04 | Stop reason: HOSPADM

## 2025-06-02 RX ORDER — FLUTICASONE PROPIONATE 50 MCG
1 SPRAY, SUSPENSION (ML) NASAL DAILY PRN
Status: DISCONTINUED | OUTPATIENT
Start: 2025-06-02 | End: 2025-06-04 | Stop reason: HOSPADM

## 2025-06-02 RX ORDER — ONDANSETRON 4 MG/1
4 TABLET, ORALLY DISINTEGRATING ORAL EVERY 8 HOURS PRN
Status: DISCONTINUED | OUTPATIENT
Start: 2025-06-02 | End: 2025-06-04 | Stop reason: HOSPADM

## 2025-06-02 RX ORDER — ATORVASTATIN CALCIUM 40 MG/1
40 TABLET, FILM COATED ORAL DAILY
Status: DISCONTINUED | OUTPATIENT
Start: 2025-06-02 | End: 2025-06-04 | Stop reason: HOSPADM

## 2025-06-02 RX ORDER — ASPIRIN 81 MG/1
81 TABLET, CHEWABLE ORAL DAILY
Status: DISCONTINUED | OUTPATIENT
Start: 2025-06-02 | End: 2025-06-04 | Stop reason: HOSPADM

## 2025-06-02 RX ORDER — POTASSIUM CHLORIDE 7.45 MG/ML
10 INJECTION INTRAVENOUS PRN
Status: DISCONTINUED | OUTPATIENT
Start: 2025-06-02 | End: 2025-06-04 | Stop reason: HOSPADM

## 2025-06-02 RX ORDER — LISINOPRIL 2.5 MG/1
2.5 TABLET ORAL DAILY
Status: DISCONTINUED | OUTPATIENT
Start: 2025-06-02 | End: 2025-06-03

## 2025-06-02 RX ORDER — ACETAMINOPHEN 650 MG/1
650 SUPPOSITORY RECTAL EVERY 6 HOURS PRN
Status: DISCONTINUED | OUTPATIENT
Start: 2025-06-02 | End: 2025-06-02

## 2025-06-02 RX ADMIN — METFORMIN HYDROCHLORIDE 1000 MG: 500 TABLET ORAL at 20:13

## 2025-06-02 RX ADMIN — CLOPIDOGREL BISULFATE 75 MG: 75 TABLET, FILM COATED ORAL at 14:57

## 2025-06-02 RX ADMIN — ASPIRIN 81 MG: 81 TABLET, CHEWABLE ORAL at 14:57

## 2025-06-02 RX ADMIN — ENOXAPARIN SODIUM 40 MG: 100 INJECTION SUBCUTANEOUS at 14:56

## 2025-06-02 RX ADMIN — LISINOPRIL 2.5 MG: 2.5 TABLET ORAL at 14:57

## 2025-06-02 RX ADMIN — ATORVASTATIN CALCIUM 40 MG: 40 TABLET, FILM COATED ORAL at 14:57

## 2025-06-02 RX ADMIN — METFORMIN HYDROCHLORIDE 1000 MG: 500 TABLET ORAL at 14:57

## 2025-06-02 ASSESSMENT — PAIN - FUNCTIONAL ASSESSMENT: PAIN_FUNCTIONAL_ASSESSMENT: NONE - DENIES PAIN

## 2025-06-02 NOTE — ED NOTES
ED to inpatient nurses report      Chief Complaint:  Chief Complaint   Patient presents with    Irregular Heart Beat     Present to ED from: Home    MOA:     LOC: alert and orientated to name, place, date  Mobility: Independent  Oxygen Baseline: Room air    Current needs required: Room air   Pending ED orders: None  Present condition: Stable    Why did the patient come to the ED? Pt was at a routine check up after stopping carvedilol due to low bp and hr. Pts hr was still low, sent to ER. Pt is now in [facility], Dr. Melton to see today for possible pacemaker. Pt has no symptoms.    What is the plan? Admit for possible pacemaker    Any procedures or intervention occur? X-ray, ct scans, blood work    Pertinent event(s) none    Safety concerns?? Fall risk    CODE STATUS Full Code    Diet ADULT DIET; Regular; Low Fat/Low Chol/High Fiber/JACKELINE    Mental Status:  Level of Consciousness: Alert (0)    Psych Assessment:      Vital signs   Vitals:    06/02/25 0947   BP: (!) 148/58   Pulse: 64   Resp: 15   Temp: 97.3 °F (36.3 °C)   TempSrc: Oral   SpO2: 95%   Weight: 85.3 kg (188 lb)   Height: 1.676 m (5' 6\")        Vitals:  Patient Vitals for the past 24 hrs:   BP Temp Temp src Pulse Resp SpO2 Height Weight   06/02/25 0947 (!) 148/58 97.3 °F (36.3 °C) Oral 64 15 95 % 1.676 m (5' 6\") 85.3 kg (188 lb)      Visit Vitals  BP (!) 148/58   Pulse 64   Temp 97.3 °F (36.3 °C) (Oral)   Resp 15   Ht 1.676 m (5' 6\")   Wt 85.3 kg (188 lb)   SpO2 95%   BMI 30.34 kg/m²        LDAs:   Peripheral IV 06/02/25 Left Antecubital (Active)   Site Assessment Clean, dry & intact 06/02/25 0953   Line Status Blood return noted 06/02/25 0953   Phlebitis Assessment No symptoms 06/02/25 0953   Infiltration Assessment 0 06/02/25 0953   Dressing Status Clean, dry & intact 06/02/25 0953       Meds:  Medications   sodium chloride flush 0.9 % injection 5-40 mL (has no administration in time range)   0.9 % sodium chloride infusion (has no administration in

## 2025-06-02 NOTE — ED PROVIDER NOTES
[address]  EMERGENCY DEPARTMENT ENCOUNTER      Pt Name: Nick More  MRN: [id number]  Birthdate 1950  Date of evaluation: 6/2/2025  Provider: YOANA Schrader CNP  6:21 PM    CHIEF COMPLAINT       Chief Complaint   Patient presents with    Irregular Heart Beat         HISTORY OF PRESENT ILLNESS    Nick More is a 74 y.o. male who presents to the emergency department for evaluation of low heart rate.  Patient was sent in by his PCP for evaluation of this.  Patient states that last week his primary care physician noticed that his heart rate was low, took him off of his carvedilol and schedule an appointment for today to have him recheck his vital signs.  Per the patient he states that the doctor told him to come over to get an EKG and check him out.  He states he really would not be here if it were not for the urging of his PCP.  He has no chest pain no shortness of breath no nausea no vomiting has not recently been ill.  He is not lightheaded or dizzy he has no numbness tingling or weakness.    Per    HPI    Nursing Notes were reviewed.    REVIEW OF SYSTEMS       Review of Systems   All other systems reviewed and are negative.      Except as noted above the remainder of the review of systems was reviewed and negative.       PAST MEDICAL HISTORY     Past Medical History:   Diagnosis Date    Arthritis     CAD (coronary artery disease)     DDD (degenerative disc disease), cervical     Diabetes mellitus (HCC)     Hyperlipidemia     Hypertension     RAMBO (obstructive sleep apnea)          SURGICAL HISTORY       Past Surgical History:   Procedure Laterality Date    CORONARY ANGIOPLASTY WITH STENT PLACEMENT      CYST REMOVAL      KNEE SURGERY           CURRENT MEDICATIONS       Current Discharge Medication List        CONTINUE these medications which have NOT CHANGED    Details   budesonide (PULMICORT) 0.5 MG/2ML nebulizer suspension Inhale 2 mLs into the lungs daily      metFORMIN (GLUCOPHAGE) 1000

## 2025-06-02 NOTE — PLAN OF CARE
Problem: Chronic Conditions and Co-morbidities  Goal: Patient's chronic conditions and co-morbidity symptoms are monitored and maintained or improved  Outcome: Progressing  Flowsheets (Taken 6/2/2025 1434)  Care Plan - Patient's Chronic Conditions and Co-Morbidity Symptoms are Monitored and Maintained or Improved: Monitor and assess patient's chronic conditions and comorbid symptoms for stability, deterioration, or improvement     Problem: Discharge Planning  Goal: Discharge to home or other facility with appropriate resources  Outcome: Progressing  Flowsheets (Taken 6/2/2025 1439)  Discharge to home or other facility with appropriate resources: Identify barriers to discharge with patient and caregiver     Problem: Safety - Adult  Goal: Free from fall injury  Outcome: Progressing     Problem: Cardiovascular - Adult  Goal: Maintains optimal cardiac output and hemodynamic stability  Outcome: Progressing  Goal: Absence of cardiac dysrhythmias or at baseline  Outcome: Progressing

## 2025-06-02 NOTE — ED PROVIDER NOTES
[facility] Emergency Department  [address]  [facility] [address]  Phone: [phone]  Fax: [phone]      Attending Physician Attestation          CHIEF COMPLAINT       Chief Complaint   Patient presents with    Irregular Heart Beat       DIAGNOSTIC RESULTS     LABS:  Labs Reviewed   BASIC METABOLIC PANEL - Abnormal; Notable for the following components:       Result Value    Glucose 111 (*)     BUN 25 (*)     All other components within normal limits   CBC WITH AUTO DIFFERENTIAL - Abnormal; Notable for the following components:    RBC 3.82 (*)     Hemoglobin 12.7 (*)     Hematocrit 37.8 (*)     Neutrophils % 68 (*)     Lymphocytes % 19 (*)     Lymphocytes Absolute 0.80 (*)     All other components within normal limits   TROPONIN - Abnormal; Notable for the following components:    Troponin, High Sensitivity 37 (*)     All other components within normal limits   MAGNESIUM   TSH   T4, FREE   URINALYSIS WITH REFLEX TO CULTURE       All other labs were within normal range or not returned as of this dictation.    RADIOLOGY:  XR CHEST PORTABLE    (Results Pending)         EMERGENCY DEPARTMENT COURSE:   Vitals:    Vitals:    06/02/25 0947   BP: (!) 148/58   Pulse: 64   Resp: 15   Temp: 97.3 °F (36.3 °C)   TempSrc: Oral   SpO2: 95%   Weight: 85.3 kg (188 lb)   Height: 1.676 m (5' 6\")     -------------------------  BP: (!) 148/58, Temp: 97.3 °F (36.3 °C), Pulse: 64, Respirations: 15    PERTINENT ATTENDING PHYSICIAN COMMENTS:    I performed a history and physical examination of the patient and discussed management with the mid level provider. I reviewed the mid level provider's note and agree with the documented findings and plan of care. Any areas of disagreement are noted on the chart. I was personally present for the key portions of any procedures. I have documented in the chart those procedures where I was not present during the key portions. I have reviewed the emergency nurses triage

## 2025-06-03 ENCOUNTER — APPOINTMENT (OUTPATIENT)
Dept: NUCLEAR MEDICINE | Age: 75
DRG: 287 | End: 2025-06-03
Payer: MEDICARE

## 2025-06-03 ENCOUNTER — APPOINTMENT (OUTPATIENT)
Age: 75
DRG: 287 | End: 2025-06-03
Payer: MEDICARE

## 2025-06-03 PROBLEM — R94.31 ABNORMAL ELECTROCARDIOGRAPHY: Status: ACTIVE | Noted: 2025-06-03

## 2025-06-03 LAB
ALBUMIN SERPL-MCNC: 4 G/DL (ref 3.5–5.2)
ALBUMIN/GLOB SERPL: 1.7 {RATIO} (ref 1–2.5)
ALP SERPL-CCNC: 74 U/L (ref 40–129)
ALT SERPL-CCNC: 26 U/L (ref 10–50)
ANION GAP SERPL CALCULATED.3IONS-SCNC: 10 MMOL/L (ref 9–16)
AST SERPL-CCNC: 22 U/L (ref 10–50)
BASOPHILS # BLD: 0 K/UL (ref 0–0.2)
BASOPHILS NFR BLD: 1 % (ref 0–2)
BILIRUB SERPL-MCNC: 0.5 MG/DL (ref 0–1.2)
BUN SERPL-MCNC: 18 MG/DL (ref 8–23)
CALCIUM SERPL-MCNC: 9.3 MG/DL (ref 8.6–10.4)
CHLORIDE SERPL-SCNC: 104 MMOL/L (ref 98–107)
CO2 SERPL-SCNC: 25 MMOL/L (ref 20–31)
CREAT SERPL-MCNC: 0.9 MG/DL (ref 0.7–1.2)
ECHO AO ASC DIAM: 3.7 CM
ECHO AO ASCENDING AORTA INDEX: 1.92 CM/M2
ECHO AO ROOT DIAM: 3.9 CM
ECHO AO ROOT INDEX: 2.02 CM/M2
ECHO AV AREA PEAK VELOCITY: 4.1 CM2
ECHO AV AREA VTI: 3.8 CM2
ECHO AV AREA/BSA PEAK VELOCITY: 2.1 CM2/M2
ECHO AV AREA/BSA VTI: 2 CM2/M2
ECHO AV MEAN GRADIENT: 4 MMHG
ECHO AV MEAN VELOCITY: 0.9 M/S
ECHO AV PEAK GRADIENT: 9 MMHG
ECHO AV PEAK VELOCITY: 1.5 M/S
ECHO AV VELOCITY RATIO: 0.87
ECHO AV VTI: 28.4 CM
ECHO BSA: 1.97 M2
ECHO BSA: 1.97 M2
ECHO EST RA PRESSURE: 3 MMHG
ECHO IVC PROX: 1.9 CM
ECHO LA AREA 2C: 13.4 CM2
ECHO LA AREA 4C: 14.1 CM2
ECHO LA DIAMETER INDEX: 1.87 CM/M2
ECHO LA DIAMETER: 3.6 CM
ECHO LA MAJOR AXIS: 4.8 CM
ECHO LA MINOR AXIS: 4.4 CM
ECHO LA TO AORTIC ROOT RATIO: 0.92
ECHO LA VOL BP: 34 ML (ref 18–58)
ECHO LA VOL MOD A2C: 32 ML (ref 18–58)
ECHO LA VOL MOD A4C: 34 ML (ref 18–58)
ECHO LA VOL/BSA BIPLANE: 18 ML/M2 (ref 16–34)
ECHO LA VOLUME INDEX MOD A2C: 17 ML/M2 (ref 16–34)
ECHO LA VOLUME INDEX MOD A4C: 18 ML/M2 (ref 16–34)
ECHO LV E' LATERAL VELOCITY: 8.06 CM/S
ECHO LV E' SEPTAL VELOCITY: 6.85 CM/S
ECHO LV EF PHYSICIAN: 55 %
ECHO LV FRACTIONAL SHORTENING: 31 % (ref 28–44)
ECHO LV INTERNAL DIMENSION DIASTOLE INDEX: 2.69 CM/M2
ECHO LV INTERNAL DIMENSION DIASTOLIC: 5.2 CM (ref 4.2–5.9)
ECHO LV INTERNAL DIMENSION SYSTOLIC INDEX: 1.87 CM/M2
ECHO LV INTERNAL DIMENSION SYSTOLIC: 3.6 CM
ECHO LV IVSD: 0.9 CM (ref 0.6–1)
ECHO LV MASS 2D: 207.3 G (ref 88–224)
ECHO LV MASS INDEX 2D: 107.4 G/M2 (ref 49–115)
ECHO LV POSTERIOR WALL DIASTOLIC: 1.2 CM (ref 0.6–1)
ECHO LV RELATIVE WALL THICKNESS RATIO: 0.46
ECHO LVOT AREA: 4.5 CM2
ECHO LVOT AV VTI INDEX: 0.85
ECHO LVOT DIAM: 2.4 CM
ECHO LVOT MEAN GRADIENT: 2 MMHG
ECHO LVOT PEAK GRADIENT: 7 MMHG
ECHO LVOT PEAK VELOCITY: 1.3 M/S
ECHO LVOT STROKE VOLUME INDEX: 56.5 ML/M2
ECHO LVOT SV: 109 ML
ECHO LVOT VTI: 24.1 CM
ECHO MV A VELOCITY: 0.85 M/S
ECHO MV AREA VTI: 4.3 CM2
ECHO MV E DECELERATION TIME (DT): 243 MS
ECHO MV E VELOCITY: 0.63 M/S
ECHO MV E/A RATIO: 0.74
ECHO MV E/E' LATERAL: 7.82
ECHO MV E/E' RATIO (AVERAGED): 8.51
ECHO MV E/E' SEPTAL: 9.2
ECHO MV LVOT VTI INDEX: 1.05
ECHO MV MAX VELOCITY: 0.9 M/S
ECHO MV MEAN GRADIENT: 1 MMHG
ECHO MV MEAN VELOCITY: 0.5 M/S
ECHO MV PEAK GRADIENT: 3 MMHG
ECHO MV VTI: 25.4 CM
ECHO PV MAX VELOCITY: 0.9 M/S
ECHO PV PEAK GRADIENT: 4 MMHG
ECHO PVEIN A DURATION: 182 MS
ECHO PVEIN A VELOCITY: 0.4 M/S
ECHO PVEIN PEAK D VELOCITY: 0.4 M/S
ECHO PVEIN PEAK S VELOCITY: 0.8 M/S
ECHO PVEIN S/D RATIO: 2 NO UNITS
ECHO RA AREA 4C: 13.5 CM2
ECHO RA END SYSTOLIC VOLUME APICAL 4 CHAMBER INDEX BSA: 17 ML/M2
ECHO RA VOLUME: 32 ML
ECHO RIGHT VENTRICULAR SYSTOLIC PRESSURE (RVSP): 24 MMHG
ECHO RV BASAL DIMENSION: 3.4 CM
ECHO RV FREE WALL PEAK S': 20.3 CM/S
ECHO RV TAPSE: 2.8 CM (ref 1.7–?)
ECHO TV PEAK GRADIENT: 2 MMHG
ECHO TV REGURGITANT MAX VELOCITY: 2.3 M/S
ECHO TV REGURGITANT PEAK GRADIENT: 21 MMHG
EOSINOPHIL # BLD: 0.1 K/UL (ref 0–0.4)
EOSINOPHILS RELATIVE PERCENT: 2 % (ref 1–4)
ERYTHROCYTE [DISTWIDTH] IN BLOOD BY AUTOMATED COUNT: 15.1 % (ref 12.5–15.4)
GFR, ESTIMATED: 89 ML/MIN/1.73M2
GLUCOSE SERPL-MCNC: 113 MG/DL (ref 74–99)
HCT VFR BLD AUTO: 39.9 % (ref 41–53)
HGB BLD-MCNC: 13.3 G/DL (ref 13.5–17.5)
LYMPHOCYTES NFR BLD: 0.8 K/UL (ref 1–4.8)
LYMPHOCYTES RELATIVE PERCENT: 17 % (ref 24–44)
MCH RBC QN AUTO: 32.6 PG (ref 26–34)
MCHC RBC AUTO-ENTMCNC: 33.3 G/DL (ref 31–37)
MCV RBC AUTO: 98.1 FL (ref 80–100)
MONOCYTES NFR BLD: 0.3 K/UL (ref 0.1–1.2)
MONOCYTES NFR BLD: 7 % (ref 2–11)
NEUTROPHILS NFR BLD: 73 % (ref 36–66)
NEUTS SEG NFR BLD: 3.6 K/UL (ref 1.8–7.7)
NUC STRESS EJECTION FRACTION: 59 %
PLATELET # BLD AUTO: 146 K/UL (ref 140–450)
PMV BLD AUTO: 8.3 FL (ref 6–12)
POTASSIUM SERPL-SCNC: 4.4 MMOL/L (ref 3.7–5.3)
PROT SERPL-MCNC: 6.4 G/DL (ref 6.6–8.7)
RBC # BLD AUTO: 4.07 M/UL (ref 4.5–5.9)
SODIUM SERPL-SCNC: 139 MMOL/L (ref 136–145)
STRESS BASELINE DIAS BP: 70 MMHG
STRESS BASELINE HR: 54 BPM
STRESS BASELINE SYS BP: 145 MMHG
STRESS ESTIMATED WORKLOAD: 1 METS
STRESS PEAK DIAS BP: 70 MMHG
STRESS PEAK SYS BP: 145 MMHG
STRESS PERCENT HR ACHIEVED: 52 %
STRESS POST PEAK HR: 76 BPM
STRESS RATE PRESSURE PRODUCT: NORMAL BPM*MMHG
STRESS TARGET HR: 145 BPM
TID: 1.01
WBC OTHER # BLD: 4.8 K/UL (ref 3.5–11)

## 2025-06-03 PROCEDURE — 93016 CV STRESS TEST SUPVJ ONLY: CPT | Performed by: INTERNAL MEDICINE

## 2025-06-03 PROCEDURE — 99222 1ST HOSP IP/OBS MODERATE 55: CPT | Performed by: FAMILY MEDICINE

## 2025-06-03 PROCEDURE — 94640 AIRWAY INHALATION TREATMENT: CPT

## 2025-06-03 PROCEDURE — 99223 1ST HOSP IP/OBS HIGH 75: CPT | Performed by: SURGERY

## 2025-06-03 PROCEDURE — 6370000000 HC RX 637 (ALT 250 FOR IP): Performed by: FAMILY MEDICINE

## 2025-06-03 PROCEDURE — 93018 CV STRESS TEST I&R ONLY: CPT | Performed by: INTERNAL MEDICINE

## 2025-06-03 PROCEDURE — 93306 TTE W/DOPPLER COMPLETE: CPT | Performed by: INTERNAL MEDICINE

## 2025-06-03 PROCEDURE — 2000000000 HC ICU R&B

## 2025-06-03 PROCEDURE — 6360000002 HC RX W HCPCS: Performed by: FAMILY MEDICINE

## 2025-06-03 PROCEDURE — 2500000003 HC RX 250 WO HCPCS: Performed by: FAMILY MEDICINE

## 2025-06-03 PROCEDURE — 93017 CV STRESS TEST TRACING ONLY: CPT

## 2025-06-03 PROCEDURE — 93306 TTE W/DOPPLER COMPLETE: CPT

## 2025-06-03 PROCEDURE — A9500 TC99M SESTAMIBI: HCPCS | Performed by: FAMILY MEDICINE

## 2025-06-03 PROCEDURE — 36415 COLL VENOUS BLD VENIPUNCTURE: CPT

## 2025-06-03 PROCEDURE — 78452 HT MUSCLE IMAGE SPECT MULT: CPT

## 2025-06-03 PROCEDURE — 80053 COMPREHEN METABOLIC PANEL: CPT

## 2025-06-03 PROCEDURE — 85025 COMPLETE CBC W/AUTO DIFF WBC: CPT

## 2025-06-03 PROCEDURE — 6360000002 HC RX W HCPCS: Performed by: SURGERY

## 2025-06-03 PROCEDURE — 3430000000 HC RX DIAGNOSTIC RADIOPHARMACEUTICAL: Performed by: FAMILY MEDICINE

## 2025-06-03 PROCEDURE — 2500000003 HC RX 250 WO HCPCS: Performed by: SURGERY

## 2025-06-03 RX ORDER — SODIUM CHLORIDE 0.9 % (FLUSH) 0.9 %
5-40 SYRINGE (ML) INJECTION PRN
Status: DISCONTINUED | OUTPATIENT
Start: 2025-06-03 | End: 2025-06-03

## 2025-06-03 RX ORDER — TETRAKIS(2-METHOXYISOBUTYLISOCYANIDE)COPPER(I) TETRAFLUOROBORATE 1 MG/ML
35 INJECTION, POWDER, LYOPHILIZED, FOR SOLUTION INTRAVENOUS
Status: COMPLETED | OUTPATIENT
Start: 2025-06-03 | End: 2025-06-03

## 2025-06-03 RX ORDER — NITROGLYCERIN 0.4 MG/1
0.4 TABLET SUBLINGUAL EVERY 5 MIN PRN
Status: DISCONTINUED | OUTPATIENT
Start: 2025-06-03 | End: 2025-06-03

## 2025-06-03 RX ORDER — SODIUM CHLORIDE 0.9 % (FLUSH) 0.9 %
5-40 SYRINGE (ML) INJECTION EVERY 12 HOURS SCHEDULED
Status: CANCELLED | OUTPATIENT
Start: 2025-06-03

## 2025-06-03 RX ORDER — AMINOPHYLLINE 25 MG/ML
50 INJECTION, SOLUTION INTRAVENOUS PRN
Status: DISCONTINUED | OUTPATIENT
Start: 2025-06-03 | End: 2025-06-03

## 2025-06-03 RX ORDER — SODIUM CHLORIDE 9 MG/ML
500 INJECTION, SOLUTION INTRAVENOUS CONTINUOUS PRN
Status: DISCONTINUED | OUTPATIENT
Start: 2025-06-03 | End: 2025-06-03

## 2025-06-03 RX ORDER — METOPROLOL TARTRATE 1 MG/ML
5 INJECTION, SOLUTION INTRAVENOUS EVERY 5 MIN PRN
Status: DISCONTINUED | OUTPATIENT
Start: 2025-06-03 | End: 2025-06-03

## 2025-06-03 RX ORDER — ALBUTEROL SULFATE 90 UG/1
2 INHALANT RESPIRATORY (INHALATION) PRN
Status: DISCONTINUED | OUTPATIENT
Start: 2025-06-03 | End: 2025-06-03

## 2025-06-03 RX ORDER — ATROPINE SULFATE 0.1 MG/ML
0.5 INJECTION INTRAVENOUS EVERY 5 MIN PRN
Status: DISCONTINUED | OUTPATIENT
Start: 2025-06-03 | End: 2025-06-03 | Stop reason: SDUPTHER

## 2025-06-03 RX ORDER — REGADENOSON 0.08 MG/ML
0.4 INJECTION, SOLUTION INTRAVENOUS
Status: COMPLETED | OUTPATIENT
Start: 2025-06-03 | End: 2025-06-03

## 2025-06-03 RX ORDER — SODIUM CHLORIDE 9 MG/ML
INJECTION, SOLUTION INTRAVENOUS CONTINUOUS
Status: CANCELLED | OUTPATIENT
Start: 2025-06-04

## 2025-06-03 RX ORDER — ALBUTEROL SULFATE 90 UG/1
2 INHALANT RESPIRATORY (INHALATION) PRN
Status: DISCONTINUED | OUTPATIENT
Start: 2025-06-03 | End: 2025-06-03 | Stop reason: SDUPTHER

## 2025-06-03 RX ORDER — LISINOPRIL 20 MG/1
20 TABLET ORAL DAILY
Status: DISCONTINUED | OUTPATIENT
Start: 2025-06-04 | End: 2025-06-04 | Stop reason: HOSPADM

## 2025-06-03 RX ORDER — ATROPINE SULFATE 0.1 MG/ML
0.5 INJECTION INTRAVENOUS EVERY 5 MIN PRN
Status: DISCONTINUED | OUTPATIENT
Start: 2025-06-03 | End: 2025-06-03

## 2025-06-03 RX ORDER — NITROGLYCERIN 0.4 MG/1
0.4 TABLET SUBLINGUAL EVERY 5 MIN PRN
Status: DISCONTINUED | OUTPATIENT
Start: 2025-06-03 | End: 2025-06-03 | Stop reason: SDUPTHER

## 2025-06-03 RX ORDER — TETRAKIS(2-METHOXYISOBUTYLISOCYANIDE)COPPER(I) TETRAFLUOROBORATE 1 MG/ML
15 INJECTION, POWDER, LYOPHILIZED, FOR SOLUTION INTRAVENOUS
Status: COMPLETED | OUTPATIENT
Start: 2025-06-03 | End: 2025-06-03

## 2025-06-03 RX ORDER — SODIUM CHLORIDE 0.9 % (FLUSH) 0.9 %
10 SYRINGE (ML) INJECTION ONCE
Status: COMPLETED | OUTPATIENT
Start: 2025-06-03 | End: 2025-06-03

## 2025-06-03 RX ORDER — METOPROLOL TARTRATE 1 MG/ML
5 INJECTION, SOLUTION INTRAVENOUS EVERY 5 MIN PRN
Status: DISCONTINUED | OUTPATIENT
Start: 2025-06-03 | End: 2025-06-03 | Stop reason: SDUPTHER

## 2025-06-03 RX ORDER — SODIUM CHLORIDE 9 MG/ML
INJECTION, SOLUTION INTRAVENOUS PRN
Status: CANCELLED | OUTPATIENT
Start: 2025-06-03

## 2025-06-03 RX ORDER — SODIUM CHLORIDE 0.9 % (FLUSH) 0.9 %
5-40 SYRINGE (ML) INJECTION PRN
Status: CANCELLED | OUTPATIENT
Start: 2025-06-03

## 2025-06-03 RX ORDER — REGADENOSON 0.08 MG/ML
0.4 INJECTION, SOLUTION INTRAVENOUS
Status: DISCONTINUED | OUTPATIENT
Start: 2025-06-03 | End: 2025-06-03 | Stop reason: SDUPTHER

## 2025-06-03 RX ORDER — AMINOPHYLLINE 25 MG/ML
50 INJECTION, SOLUTION INTRAVENOUS PRN
Status: DISCONTINUED | OUTPATIENT
Start: 2025-06-03 | End: 2025-06-03 | Stop reason: SDUPTHER

## 2025-06-03 RX ADMIN — CLOPIDOGREL BISULFATE 75 MG: 75 TABLET, FILM COATED ORAL at 08:16

## 2025-06-03 RX ADMIN — TETRAKIS(2-METHOXYISOBUTYLISOCYANIDE)COPPER(I) TETRAFLUOROBORATE 15.2 MILLICURIE: 1 INJECTION, POWDER, LYOPHILIZED, FOR SOLUTION INTRAVENOUS at 09:25

## 2025-06-03 RX ADMIN — SODIUM CHLORIDE, PRESERVATIVE FREE 10 ML: 5 INJECTION INTRAVENOUS at 10:22

## 2025-06-03 RX ADMIN — METFORMIN HYDROCHLORIDE 1000 MG: 500 TABLET ORAL at 08:16

## 2025-06-03 RX ADMIN — SODIUM CHLORIDE, PRESERVATIVE FREE 10 ML: 5 INJECTION INTRAVENOUS at 09:37

## 2025-06-03 RX ADMIN — BUDESONIDE 500 MCG: 0.5 SUSPENSION RESPIRATORY (INHALATION) at 09:28

## 2025-06-03 RX ADMIN — REGADENOSON 0.4 MG: 0.08 INJECTION, SOLUTION INTRAVENOUS at 10:27

## 2025-06-03 RX ADMIN — TETRAKIS(2-METHOXYISOBUTYLISOCYANIDE)COPPER(I) TETRAFLUOROBORATE 39.84 MILLICURIE: 1 INJECTION, POWDER, LYOPHILIZED, FOR SOLUTION INTRAVENOUS at 11:00

## 2025-06-03 RX ADMIN — ENOXAPARIN SODIUM 40 MG: 100 INJECTION SUBCUTANEOUS at 08:16

## 2025-06-03 RX ADMIN — LISINOPRIL 2.5 MG: 2.5 TABLET ORAL at 08:16

## 2025-06-03 RX ADMIN — ASPIRIN 81 MG: 81 TABLET, CHEWABLE ORAL at 08:16

## 2025-06-03 RX ADMIN — ATORVASTATIN CALCIUM 40 MG: 40 TABLET, FILM COATED ORAL at 08:16

## 2025-06-03 RX ADMIN — SODIUM CHLORIDE, PRESERVATIVE FREE 10 ML: 5 INJECTION INTRAVENOUS at 08:16

## 2025-06-03 NOTE — PLAN OF CARE
Problem: Chronic Conditions and Co-morbidities  Goal: Patient's chronic conditions and co-morbidity symptoms are monitored and maintained or improved  6/3/2025 0009 by Carleen Prater RN  Outcome: Progressing  6/2/2025 1617 by Kajal Walker RN  Outcome: Progressing  Flowsheets (Taken 6/2/2025 1435)  Care Plan - Patient's Chronic Conditions and Co-Morbidity Symptoms are Monitored and Maintained or Improved: Monitor and assess patient's chronic conditions and comorbid symptoms for stability, deterioration, or improvement     Problem: Discharge Planning  Goal: Discharge to home or other facility with appropriate resources  6/3/2025 0009 by Carleen Prater RN  Outcome: Progressing  6/2/2025 1617 by Kajal Walker RN  Outcome: Progressing  Flowsheets (Taken 6/2/2025 1435)  Discharge to home or other facility with appropriate resources: Identify barriers to discharge with patient and caregiver     Problem: Safety - Adult  Goal: Free from fall injury  6/3/2025 0009 by Carleen Prater RN  Outcome: Progressing  6/2/2025 1617 by Kajal Wlaker RN  Outcome: Progressing     Problem: Cardiovascular - Adult  Goal: Maintains optimal cardiac output and hemodynamic stability  6/3/2025 0009 by Carleen Prater RN  Outcome: Progressing  6/2/2025 1617 by Kajal Walker RN  Outcome: Progressing  Goal: Absence of cardiac dysrhythmias or at baseline  6/3/2025 0009 by Carleen Prater RN  Outcome: Progressing  6/2/2025 1617 by Kajal Walker RN  Outcome: Progressing

## 2025-06-03 NOTE — H&P (VIEW-ONLY)
[facility] M.D., M.H.A.  Neftaly Trinidad M.D., M.B.A.      [facility]  [address]   Cardio-Oncology Service  [address] [place]  [place] [address]  [address] [place] [address]  Phone: [phone]  [place] [address]   Phone: [phone]  Fax: [phone]  Phone: [phone]  Fax: [phone]        Name:   Nick More   :   1950   PCP:   Harsh Mora MD   Facility:  [facility]   Encounter Date: 25       Indication for Evaluation:   1. Bradycardia    2. Abnormal electrocardiography    3. Shortness of breath    4. Palpitations      Referring Physician:  Harsh Mora MD   Admission Date:  2025       HPI: Nick More is a 74 y.o. male who presents to the emergency department after being sent from Dr. Anderson's  office with concern for bradycardia.  He was extubated mg at a time.  EKG interpreted in the ED showed sinus rhythm at a rate of 60 beats per minute with PVCs and occasional bigeminy;  There were no ischemic changes noted.  Patient was admitted for further cardiac workup.      He has a prior history of CAD with prior stenting to the RCA and LAD in , type 2 diabetes, hyperlipidemia, hypertension and  sleep apnea with no CPAP.      2019 Cath ([facility]):  Coronary arteries: The coronary circulation is right dominant.  Left main: Normal.  LAD: Prior intervention: stent in the proximal LAD. The stented segment is patent. Proximal vessel lesion: There is a 70% stenosis. Distal vessel lesion: There is a 70% stenosis. Midvessel lesion: There is a 70% stenosis.  Left circumflex: Normal.  2nd obtuse marginal: Proximal vessel lesion: There is a 60% stenosis.  Right coronary: Distal vessel lesion: There is a 50% stenosis. Proximal vessel lesion: There is a 50% stenosis.  Left ventricle: Systolic

## 2025-06-03 NOTE — PROGRESS NOTES
Spiritual Health History and Assessment/Progress Note  [facility]    (P) Spiritual/Emotional Needs, Initial Encounter,  , (P) Life Adjustments, Adjustment to illness,      Name: Nick More MRN: [id number]    Age: 74 y.o.     Sex: male   Language: English   Christian: Unknown   Bradycardia by electrocardiogram     Date: 6/2/2025            Total Time Calculated: (P) 20 min              Spiritual Assessment began in [facility]        Referral/Consult From: (P) Rounding   Encounter Overview/Reason: (P) Spiritual/Emotional Needs, Initial Encounter  Service Provided For: (P) Patient       introduced self and role. Pt. Was welcoming and open to conversation and and discussion.  provided support and pastoral care during visit.  provided encouragement and prayer was offered for comfort, strength and peace of mind.    [place], Belief, Meaning:   Patient has beliefs or practices that help with coping during difficult times  Family/Friends No family/friends present      Importance and Influence:  Patient has spiritual/personal beliefs that influence decisions regarding their health  Family/Friends No family/friends present    Community:  Patient feels well-supported. Support system includes: Spouse/Partner and Children  Family/Friends No family/friends present    Assessment and Plan of Care:     Patient Interventions include: Facilitated expression of thoughts and feelings and Explored spiritual coping/struggle/distress  Family/Friends Interventions include: No family/friends present    Patient Plan of Care: Spiritual Care available upon further referral  Family/Friends Plan of Care: No family/friends present    Electronically signed by Chaplain VITO on 6/2/2025 at 8:19 PM    06/02/25 2017   Encounter Summary   Encounter Overview/Reason Spiritual/Emotional Needs;Initial Encounter   Service Provided For Patient   Referral/Consult From [facility]   Support System

## 2025-06-03 NOTE — PROGRESS NOTES
Pt ambulated to stress lab, steady gait, procedure explained to patient, questions answered, consent signed, IV flushed, cardiac monitor placed. Vaishnavi Wolff CNP to [facility] to supervise exam.

## 2025-06-03 NOTE — PROGRESS NOTES
Cardiology:    Very confusing as to why a stress test was ordered. As it turn out this was ordered by the NP for [facility] as there were multiple consult orders placed for three separate cardiology groups.   As it turns out the patient is established with Dr Abi Traore and as such we were chosen by default to render an opinion.   Stress test was read as \"high risk\". Reading is somewhat confusing as there is regional ischemia that does not fit anatomically. Additionally the dictation states EF of 59% and considers this a high risk. In my opinion this EF is normal and corresponds to his echo from today.   None the less since stress was ordered and it is considered high risk so a cath will need to be done to assess coronary anatomy.     Order placed for cath for tomorrow.    Ag Booth MD

## 2025-06-03 NOTE — PLAN OF CARE
Problem: Chronic Conditions and Co-morbidities  Goal: Patient's chronic conditions and co-morbidity symptoms are monitored and maintained or improved  6/3/2025 0917 by Kajal Walker RN  Outcome: Progressing  Flowsheets (Taken 6/3/2025 0730)  Care Plan - Patient's Chronic Conditions and Co-Morbidity Symptoms are Monitored and Maintained or Improved: Monitor and assess patient's chronic conditions and comorbid symptoms for stability, deterioration, or improvement  6/3/2025 0009 by Carleen Prater RN  Outcome: Progressing     Problem: Discharge Planning  Goal: Discharge to home or other facility with appropriate resources  6/3/2025 0917 by Kajal Walker RN  Outcome: Progressing  Flowsheets (Taken 6/3/2025 0730)  Discharge to home or other facility with appropriate resources: Identify barriers to discharge with patient and caregiver  6/3/2025 0009 by Carleen Prater RN  Outcome: Progressing     Problem: Safety - Adult  Goal: Free from fall injury  6/3/2025 0917 by Kajal Walker RN  Outcome: Progressing  6/3/2025 0009 by Carleen Prater RN  Outcome: Progressing     Problem: Cardiovascular - Adult  Goal: Maintains optimal cardiac output and hemodynamic stability  6/3/2025 0917 by Kajal Walker RN  Outcome: Progressing  Flowsheets (Taken 6/3/2025 0730)  Maintains optimal cardiac output and hemodynamic stability: Monitor blood pressure and heart rate  6/3/2025 0009 by Carleen Prater RN  Outcome: Progressing  Goal: Absence of cardiac dysrhythmias or at baseline  6/3/2025 0917 by Kajal Walker RN  Outcome: Progressing  Flowsheets (Taken 6/3/2025 0730)  Absence of cardiac dysrhythmias or at baseline: Monitor cardiac rate and rhythm  6/3/2025 0009 by Carleen Prater RN  Outcome: Progressing

## 2025-06-03 NOTE — CARE COORDINATION
Case Management Assessment  Initial Evaluation    Date/Time of Evaluation: 6/3/2025 3:03 PM  Assessment Completed by: Sharlene Astudillo    If patient is discharged prior to next notation, then this note serves as note for discharge by case management.    Patient Name: Nick More                   YOB: 1950  Diagnosis: Bradycardia by electrocardiogram [R00.1]                   Date / Time: 6/2/2025  9:38 AM    Patient Admission Status: Inpatient   Readmission Risk (Low < 19, Mod (19-27), High > 27): Readmission Risk Score: 8    Current PCP: Harsh Mora MD  PCP verified by CM? Yes    Chart Reviewed: Yes      History Provided by: Patient  Patient Orientation: Alert and Oriented    Patient Cognition: Alert    Hospitalization in the last 30 days (Readmission):  No    If yes, Readmission Assessment in  Navigator will be completed.    Advance Directives:      Code Status: Full Code   Patient's Primary Decision Maker is: Legal Next of Kin (wife)    Primary Decision Maker: BrycenhungAlisa - Spouse - [phone]    Secondary Decision Maker: Derik Denise Melendrez - Child - [phone]    Discharge Planning:    Patient lives with: Family Members Type of Home: House  Primary Care Giver: Self  Patient Support Systems include: Family Members   Current Financial resources: Medicare  Current community resources: None  Current services prior to admission: Durable Medical Equipment            Current DME: Cane (uses at times)            Type of Home Care services:  None    ADLS  Prior functional level: Independent in ADLs/IADLs  Current functional level: Independent in ADLs/IADLs    PT AM-PAC:   /24  OT AM-PAC:   /24    Family can provide assistance at DC: Yes  Would you like Case Management to discuss the discharge plan with any other family members/significant others, and if so, who? No  Plans to Return to Present Housing: Yes  Other Identified Issues/Barriers to RETURNING to current housing: none  Potential

## 2025-06-03 NOTE — PLAN OF CARE
Problem: Respiratory - Adult  Goal: Achieves optimal ventilation and oxygenation  Outcome: Progressing  Flowsheets (Taken 6/3/2025 1565)  Achieves optimal ventilation and oxygenation:   Assess for changes in respiratory status   Assess for changes in mentation and behavior   Respiratory therapy support as indicated

## 2025-06-03 NOTE — CONSULTS
[facility]    Consult                        Today's Date: 6/3/2025  Patient Name: Nick More  Date of admission: 6/2/2025  9:38 AM  Patient's age: 74 y.o., 1950  Admission Dx: Bradycardia by electrocardiogram [R00.1]    Reason for Consult:  Cardiac evaluation    Requesting Physician: Harsh Mora MD    CHIEF COMPLAINT:  low heart rate     History Obtained From:  patient, electronic medical record    HISTORY OF PRESENT ILLNESS:      The patient is a 74 y.o.  male who is admitted directly from his pcp office for bradycardia  .PCP had stopped his coreg , no dizziness or chest pain , follows up with Dr. Carter , no recent cardiac workup ,  has stenting to his heart 20 years doesn't remember where     Past Medical History:   has a past medical history of Arthritis, CAD (coronary artery disease), DDD (degenerative disc disease), cervical, Diabetes mellitus (HCC), Hyperlipidemia, Hypertension, and RAMBO (obstructive sleep apnea).    Past Surgical History:   has a past surgical history that includes Coronary angioplasty with stent; knee surgery; and cyst removal.     Home Medications:    Prior to Admission medications    Medication Sig Start Date End Date Taking? Authorizing Provider   budesonide (PULMICORT) 0.5 MG/2ML nebulizer suspension Inhale 2 mLs into the lungs daily 12/10/24   ProviderLázaro MD   metFORMIN (GLUCOPHAGE) 1000 MG tablet TAKE ONE TABLET BY MOUTH TWICE DAILY 5/5/25   Harsh Mora MD   lisinopril (PRINIVIL;ZESTRIL) 2.5 MG tablet TAKE ONE TABLET BY MOUTH ONE TIME DAILY 8/8/24   Harsh Mora MD   ibuprofen (ADVIL;MOTRIN) 200 MG tablet Take 1 tablet by mouth every 6 hours as needed    ProviderLázaro MD   azelastine (ASTELIN) 0.1 % nasal spray 1 spray by Nasal route 2 times daily as needed Use in each nostril as directed    ProviderLázaro MD   fluticasone (FLONASE ALLERGY RELIEF) 50 MCG/ACT nasal spray daily as needed    ProviderLázaro MD 
acetaminophen (TYLENOL) tablet 650 mg, 650 mg, Oral, Q6H PRN **OR** [DISCONTINUED] acetaminophen (TYLENOL) suppository 650 mg, 650 mg, Rectal, Q6H PRN, Harsh Mora MD    aspirin chewable tablet 81 mg, 81 mg, Oral, Daily, Harsh Mora MD, 81 mg at 06/03/25 0816    atorvastatin (LIPITOR) tablet 40 mg, 40 mg, Oral, Daily, Harsh Mora MD, 40 mg at 06/03/25 0816    azelastine (ASTELIN) 0.1 % nasal spray 1 spray (Patient Supplied), 1 spray, Each Nostril, BID PRN, Harsh Mora MD    budesonide (PULMICORT) nebulizer suspension 500 mcg, 0.5 mg, Nebulization, Daily, Harsh Mora MD, 500 mcg at 06/03/25 0928    clopidogrel (PLAVIX) tablet 75 mg, 75 mg, Oral, Daily, Harsh Mora MD, 75 mg at 06/03/25 0816    fluticasone (FLONASE) 50 MCG/ACT nasal spray 1 spray, 1 spray, Each Nostril, Daily PRN, Harsh Mora MD    metFORMIN (GLUCOPHAGE) tablet 1,000 mg, 1,000 mg, Oral, BID, Harsh Mora MD, 1,000 mg at 06/03/25 0816     ALLERGIES:     Allergies   Allergen Reactions    No Known Allergies         VITALS:     Vitals:    06/03/25 1018 06/03/25 1024 06/03/25 1031 06/03/25 1033   BP:  (!) 145/70 (!) 141/67 (!) 142/75   Pulse:  56 71 68   Resp:       Temp:       TempSrc:       SpO2:       Weight: 83 kg (183 lb)      Height: 1.676 m (5' 6\")         Body mass index is 29.54 kg/m².       PHYSICAL EXAM:     Physical Exam     LAB REVIEW:     CBC:  Lab Results   Component Value Date    WBC 4.2 06/02/2025    HGB 12.7 (L) 06/02/2025    HCT 37.8 (L) 06/02/2025    MCV 98.8 06/02/2025     06/02/2025     BMP:  Lab Results   Component Value Date    GLUCOSE 111 (H) 06/02/2025    CALCIUM 9.8 06/02/2025     06/02/2025    K 4.2 06/02/2025    CO2 22 06/02/2025     06/02/2025    BUN 25 (H) 06/02/2025    CREATININE 0.9 06/02/2025    CREATININE 0.9 04/29/2025    CREATININE 0.8 10/10/2024     PT/INR:No results found for: \"INR\", \"PROTIME\"  APTT: No components found for: \"PTT\"  MAG:   Lab Results   Component Value Date

## 2025-06-03 NOTE — H&P
[facility] Medicine   IN-PATIENT [facility] - Location: [place]    HISTORY AND PHYSICAL EXAMINATION            Date:   6/3/2025  Patient name:  Nick More  Date of admission:  6/2/2025  9:38 AM  MRN:   [id number]  Account:  [id number]  YOB: 1950  PCP:    Harsh Mora MD  Room:   [address] [place] [address]  Code Status:    Full Code      History Obtained From:     patient    History of Present Illness:     Nick More is a 74 y.o. Non- / non  male who presents with Irregular Heart Beat   and is admitted to the hospital for the management of Bradycardia by electrocardiogram.    Patient states he was in the office recently to see PCP and noted to have low heart rate so his carvedilol was held. He came in for nurse appointment yesterday for recheck of vitals, specifically pulse and due to it being low again, had EKG indicating bradycardia and PVCs.  He denies any recent chest pain shortness of breath or syncope.  He was sent to the ER for further evaluation and pulse was noted to be low, pulse in the 60s, telemetry showing PVCs with occasional bigeminy.  Overnight lowest pulse was 49.  His troponin was 37 in the ER.  Cardiology was consulted.  He is having stress test and echo this morning.  He continues to be asymptomatic and Coreg continues to be held  He otherwise has been feeling well.  He recalls having stents over 20 to 25 years ago    Past Medical History:     Past Medical History:   Diagnosis Date    Arthritis     CAD (coronary artery disease)     DDD (degenerative disc disease), cervical     Diabetes mellitus (HCC)     Hyperlipidemia     Hypertension     RAMBO (obstructive sleep apnea)         Past Surgical History:     Past Surgical History:   Procedure Laterality Date    CORONARY ANGIOPLASTY WITH STENT PLACEMENT      CYST REMOVAL      KNEE SURGERY          Medications Prior to Admission:     Prior to Admission medications    Medication Sig Start Date End Date Taking?

## 2025-06-04 ENCOUNTER — APPOINTMENT (OUTPATIENT)
Age: 75
DRG: 287 | End: 2025-06-04
Payer: MEDICARE

## 2025-06-04 VITALS
SYSTOLIC BLOOD PRESSURE: 164 MMHG | OXYGEN SATURATION: 96 % | RESPIRATION RATE: 13 BRPM | DIASTOLIC BLOOD PRESSURE: 107 MMHG | HEIGHT: 66 IN | HEART RATE: 63 BPM | BODY MASS INDEX: 29.41 KG/M2 | WEIGHT: 183 LBS | TEMPERATURE: 97.8 F

## 2025-06-04 LAB
ECHO BSA: 1.97 M2
ECHO BSA: 1.97 M2
EKG ATRIAL RATE: 60 BPM
EKG ATRIAL RATE: 64 BPM
EKG P AXIS: 28 DEGREES
EKG P AXIS: 50 DEGREES
EKG P-R INTERVAL: 124 MS
EKG P-R INTERVAL: 184 MS
EKG Q-T INTERVAL: 390 MS
EKG Q-T INTERVAL: 398 MS
EKG QRS DURATION: 80 MS
EKG QRS DURATION: 80 MS
EKG QTC CALCULATION (BAZETT): 390 MS
EKG QTC CALCULATION (BAZETT): 410 MS
EKG R AXIS: 4 DEGREES
EKG R AXIS: 8 DEGREES
EKG T AXIS: 18 DEGREES
EKG T AXIS: 33 DEGREES
EKG VENTRICULAR RATE: 60 BPM
EKG VENTRICULAR RATE: 64 BPM
GLUCOSE BLD-MCNC: 131 MG/DL (ref 75–110)

## 2025-06-04 PROCEDURE — 6360000002 HC RX W HCPCS: Performed by: INTERNAL MEDICINE

## 2025-06-04 PROCEDURE — 2500000003 HC RX 250 WO HCPCS: Performed by: INTERNAL MEDICINE

## 2025-06-04 PROCEDURE — 2709999900 HC NON-CHARGEABLE SUPPLY: Performed by: INTERNAL MEDICINE

## 2025-06-04 PROCEDURE — C1894 INTRO/SHEATH, NON-LASER: HCPCS | Performed by: INTERNAL MEDICINE

## 2025-06-04 PROCEDURE — 99152 MOD SED SAME PHYS/QHP 5/>YRS: CPT | Performed by: INTERNAL MEDICINE

## 2025-06-04 PROCEDURE — B2111ZZ FLUOROSCOPY OF MULTIPLE CORONARY ARTERIES USING LOW OSMOLAR CONTRAST: ICD-10-PCS | Performed by: INTERNAL MEDICINE

## 2025-06-04 PROCEDURE — 82947 ASSAY GLUCOSE BLOOD QUANT: CPT

## 2025-06-04 PROCEDURE — 93454 CORONARY ARTERY ANGIO S&I: CPT | Performed by: INTERNAL MEDICINE

## 2025-06-04 PROCEDURE — 2580000003 HC RX 258: Performed by: FAMILY MEDICINE

## 2025-06-04 PROCEDURE — 6370000000 HC RX 637 (ALT 250 FOR IP): Performed by: FAMILY MEDICINE

## 2025-06-04 PROCEDURE — B2151ZZ FLUOROSCOPY OF LEFT HEART USING LOW OSMOLAR CONTRAST: ICD-10-PCS | Performed by: INTERNAL MEDICINE

## 2025-06-04 PROCEDURE — 99239 HOSP IP/OBS DSCHRG MGMT >30: CPT | Performed by: FAMILY MEDICINE

## 2025-06-04 PROCEDURE — 93242 EXT ECG>48HR<7D RECORDING: CPT

## 2025-06-04 PROCEDURE — 6360000004 HC RX CONTRAST MEDICATION: Performed by: INTERNAL MEDICINE

## 2025-06-04 PROCEDURE — 4A023N7 MEASUREMENT OF CARDIAC SAMPLING AND PRESSURE, LEFT HEART, PERCUTANEOUS APPROACH: ICD-10-PCS | Performed by: INTERNAL MEDICINE

## 2025-06-04 PROCEDURE — C1769 GUIDE WIRE: HCPCS | Performed by: INTERNAL MEDICINE

## 2025-06-04 RX ORDER — LIDOCAINE HYDROCHLORIDE 10 MG/ML
INJECTION, SOLUTION INFILTRATION; PERINEURAL PRN
Status: DISCONTINUED | OUTPATIENT
Start: 2025-06-04 | End: 2025-06-04 | Stop reason: HOSPADM

## 2025-06-04 RX ORDER — IOPAMIDOL 755 MG/ML
INJECTION, SOLUTION INTRAVASCULAR PRN
Status: DISCONTINUED | OUTPATIENT
Start: 2025-06-04 | End: 2025-06-04 | Stop reason: HOSPADM

## 2025-06-04 RX ORDER — SODIUM CHLORIDE 9 MG/ML
INJECTION, SOLUTION INTRAVENOUS PRN
Status: DISCONTINUED | OUTPATIENT
Start: 2025-06-04 | End: 2025-06-04 | Stop reason: HOSPADM

## 2025-06-04 RX ORDER — SODIUM CHLORIDE 0.9 % (FLUSH) 0.9 %
5-40 SYRINGE (ML) INJECTION EVERY 12 HOURS SCHEDULED
Status: DISCONTINUED | OUTPATIENT
Start: 2025-06-04 | End: 2025-06-04 | Stop reason: HOSPADM

## 2025-06-04 RX ORDER — ACETAMINOPHEN 325 MG/1
650 TABLET ORAL EVERY 4 HOURS PRN
Status: DISCONTINUED | OUTPATIENT
Start: 2025-06-04 | End: 2025-06-04 | Stop reason: HOSPADM

## 2025-06-04 RX ORDER — MIDAZOLAM 1 MG/ML
INJECTION INTRAMUSCULAR; INTRAVENOUS PRN
Status: DISCONTINUED | OUTPATIENT
Start: 2025-06-04 | End: 2025-06-04 | Stop reason: HOSPADM

## 2025-06-04 RX ORDER — SODIUM CHLORIDE 0.9 % (FLUSH) 0.9 %
5-40 SYRINGE (ML) INJECTION PRN
Status: DISCONTINUED | OUTPATIENT
Start: 2025-06-04 | End: 2025-06-04 | Stop reason: HOSPADM

## 2025-06-04 RX ADMIN — SODIUM CHLORIDE: 0.9 INJECTION, SOLUTION INTRAVENOUS at 08:22

## 2025-06-04 RX ADMIN — ASPIRIN 81 MG: 81 TABLET, CHEWABLE ORAL at 07:49

## 2025-06-04 RX ADMIN — CLOPIDOGREL BISULFATE 75 MG: 75 TABLET, FILM COATED ORAL at 07:49

## 2025-06-04 NOTE — CARE COORDINATION
[facility] Quality Flow/Interdisciplinary Rounds Progress Note    Quality Flow Rounds held on 2025 at 0930    Disciplines Attending:  Bedside Nurse, , and Nursing Unit Leadership    Barriers to Discharge: clinical status    Anticipated Discharge Date:   25    Anticipated Discharge Disposition: Home     [facility] RISK OF UNPLANNED READMISSION 2.0             7.2 Total Score        Discussed patient goal for the day, patient clinical progression, and barriers to discharge.  S/p cardiac cath this morning. Plan for discharge later today. Patient will return home independently. Spouse to transport.    Discharge Report    [facility]  Clinical Case Management Department  Written by: Carleen Hinson RN    Patient Name: Nick More  Attending Provider: Harsh Mora MD  Admit Date: 2025  9:38 AM  MRN: [id number]  Account: [id number]                     : 1950  Discharge Date: 25      Disposition: home    Carleen Hinson RN

## 2025-06-04 NOTE — DISCHARGE SUMMARY
[facility] Family Medicine  IN-PATIENT SERVICE   [facility]    Discharge Summary     Patient ID: Nick More  :  1950   MRN: [id number]     ACCOUNT:  [id number]   Patient's PCP: Harsh Mora MD  Admit Date: 2025   Discharge Date: 2025  Length of Stay: 2  Code Status:  Full Code  Admitting Physician: Harsh Mora MD  Discharge Physician: Coleman Rincon MD     Active Discharge Diagnoses:    Hospital Problem Lists:  Principal Problem:    Bradycardia by electrocardiogram  Active Problems:    Benign essential hypertension    Obstructive sleep apnea syndrome    Presence of stent in coronary artery    Controlled type 2 diabetes mellitus with hyperglycemia, without long-term current use of insulin (HCC)    Type 2 diabetes mellitus without obesity (HCC)    Abnormal electrocardiography  Resolved Problems:    * No resolved hospital problems. *      Admission Condition:  fair     Discharged Condition: good    Hospital Stay:    Hospital Course:  Nick More is a 74 y.o. male who was admitted for the management of Bradycardia by electrocardiogram , presented to ER with Irregular Heart Beat    Seen by cardiology service and echocardiogram done stress test done ultimately cardiac catheterization done to evaluate his condition.  Physical Exam:  GENERAL APPEARANCE: No acute distress  SKIN: Unremarkable  HEENT: Unremarkable  HEART: Regular S1-S2  LUNGS: Clear to auscultation  ABDOMEN: Soft, no real tenderness liver spleen not felt  NEURO: Moves all 4 limbs, oriented x 3, cranial nerves intact  BACK: No significant back pain  EXTREMITIES: No significant edema, Homans' sign negative  PSYCHOLOGICAL: Oriented to time place person, no significant anxiety or depression or dementia    Significant therapeutic interventions: Patient underwent cardiac evaluation due to bradycardia.  Most likely related to beta-blocker which had been stopped as an outpatient.  Echocardiogram showed fairly

## 2025-06-04 NOTE — PLAN OF CARE
Problem: Chronic Conditions and Co-morbidities  Goal: Patient's chronic conditions and co-morbidity symptoms are monitored and maintained or improved  6/4/2025 1147 by Kajal Walker RN  Outcome: Completed  Flowsheets (Taken 6/4/2025 0745)  Care Plan - Patient's Chronic Conditions and Co-Morbidity Symptoms are Monitored and Maintained or Improved: Monitor and assess patient's chronic conditions and comorbid symptoms for stability, deterioration, or improvement  6/4/2025 0724 by Kajal Walker RN  Outcome: Progressing  6/4/2025 0002 by Carleen Prater RN  Outcome: Progressing     Problem: Discharge Planning  Goal: Discharge to home or other facility with appropriate resources  6/4/2025 1147 by Kajal Walker RN  Outcome: Completed  Flowsheets (Taken 6/4/2025 0745)  Discharge to home or other facility with appropriate resources: Identify barriers to discharge with patient and caregiver  6/4/2025 0724 by Kajal Walker RN  Outcome: Progressing  6/4/2025 0002 by Carleen Prater RN  Outcome: Progressing     Problem: Safety - Adult  Goal: Free from fall injury  6/4/2025 1147 by Kajal Walker RN  Outcome: Completed  6/4/2025 0724 by Kajal Walker RN  Outcome: Progressing  6/4/2025 0002 by Carleen Prater RN  Outcome: Progressing     Problem: Cardiovascular - Adult  Goal: Maintains optimal cardiac output and hemodynamic stability  6/4/2025 1147 by Kajal Walker RN  Outcome: Completed  Flowsheets (Taken 6/4/2025 0745)  Maintains optimal cardiac output and hemodynamic stability: Monitor blood pressure and heart rate  6/4/2025 0724 by Kajal Walker RN  Outcome: Progressing  6/4/2025 0002 by Carleen Prater RN  Outcome: Progressing  Goal: Absence of cardiac dysrhythmias or at baseline  6/4/2025 1147 by Kajal Walker RN  Outcome: Completed  Flowsheets (Taken 6/4/2025 0745)  Absence of cardiac dysrhythmias or at baseline: Monitor cardiac rate and rhythm  6/4/2025 0724 by Kajal Walker RN  Outcome:

## 2025-06-04 NOTE — PLAN OF CARE
Problem: Chronic Conditions and Co-morbidities  Goal: Patient's chronic conditions and co-morbidity symptoms are monitored and maintained or improved  Outcome: Progressing     Problem: Discharge Planning  Goal: Discharge to home or other facility with appropriate resources  Outcome: Progressing     Problem: Safety - Adult  Goal: Free from fall injury  Outcome: Progressing     Problem: Cardiovascular - Adult  Goal: Maintains optimal cardiac output and hemodynamic stability  Outcome: Progressing  Goal: Absence of cardiac dysrhythmias or at baseline  Outcome: Progressing     Problem: Respiratory - Adult  Goal: Achieves optimal ventilation and oxygenation  Outcome: Progressing

## 2025-06-04 NOTE — INTERVAL H&P NOTE
Update History & Physical    The patient's History and Physical reviewed. Stress c/w ischemia. Will proceed to cath. R/B/A discussed w[place] pt anf he agrees to proceed    Electronically signed by Neftaly Trinidad MD on 6/4/2025 at 8:57 AM

## 2025-06-04 NOTE — DISCHARGE INSTR - COC
7.2 Total Score        Discharging to Facility/ Agency   Name:   Address:  Phone:  Fax:    Dialysis Facility (if applicable)   Name:  Address:  Dialysis Schedule:  Phone:  Fax:    / signature: {Esignature:[id number]}    PHYSICIAN SECTION    Prognosis: Good    Condition at Discharge: Stable    Rehab Potential (if transferring to Rehab): Good    Recommended Labs or Other Treatments After Discharge: See PCP and cardiologist as discussed    Physician Certification: I certify the above information and transfer of Nick More  is necessary for the continuing treatment of the diagnosis listed and that he requires Home Care for less 30 days.     Update Admission H&P: No change in H&P    PHYSICIAN SIGNATURE:  Electronically signed by Coleman Rincon MD on 6/4/25 at 11:25 AM EDT

## 2025-06-04 NOTE — PROCEDURES
PROCEDURE NOTE  Date: 6/4/2025   Name: Nick More  YOB: 1950    Procedures    Cath  Patent stents LAD  Luminal irregs without critical stenosis.    Ok Home with Med Rx

## 2025-06-04 NOTE — PRE SEDATION
Sedation Plan  ASA: class 2 - patient with mild systemic disease     Mallampati class: II - soft palate, uvula, fauces visible.

## 2025-06-04 NOTE — PLAN OF CARE
Problem: Chronic Conditions and Co-morbidities  Goal: Patient's chronic conditions and co-morbidity symptoms are monitored and maintained or improved  6/4/2025 0724 by Kajal Walker RN  Outcome: Progressing  6/4/2025 0002 by Carleen Prater RN  Outcome: Progressing     Problem: Discharge Planning  Goal: Discharge to home or other facility with appropriate resources  6/4/2025 0724 by Kajal Walker RN  Outcome: Progressing  6/4/2025 0002 by Carleen Prater RN  Outcome: Progressing     Problem: Safety - Adult  Goal: Free from fall injury  6/4/2025 0724 by Kajal Walker RN  Outcome: Progressing  6/4/2025 0002 by Carleen Prater RN  Outcome: Progressing     Problem: Cardiovascular - Adult  Goal: Maintains optimal cardiac output and hemodynamic stability  6/4/2025 0724 by Kajal Walker RN  Outcome: Progressing  6/4/2025 0002 by Carleen Prater RN  Outcome: Progressing  Goal: Absence of cardiac dysrhythmias or at baseline  6/4/2025 0724 by Kajal Walker RN  Outcome: Progressing  6/4/2025 0002 by Carleen Prater RN  Outcome: Progressing     Problem: Respiratory - Adult  Goal: Achieves optimal ventilation and oxygenation  6/4/2025 0724 by Kajal Walker RN  Outcome: Progressing  6/4/2025 0002 by Carleen Prater RN  Outcome: Progressing

## 2025-06-05 ENCOUNTER — CARE COORDINATION (OUTPATIENT)
Dept: CARE COORDINATION | Age: 75
End: 2025-06-05

## 2025-06-05 DIAGNOSIS — R00.1 BRADYCARDIA BY ELECTROCARDIOGRAM: Primary | ICD-10-CM

## 2025-06-05 PROCEDURE — 1111F DSCHRG MED/CURRENT MED MERGE: CPT

## 2025-06-05 NOTE — CARE COORDINATION
Care Transitions Note    Initial Call - Call within 2 business days of discharge: Yes    Patient Current Location:  Home: [address] [place], [place] [address]    Care Transition Nurse contacted the patient by telephone to perform post hospital discharge assessment, verified name and  as identifiers.  Provided introduction to self, and explanation of the Care Transition Nurse role.    Patient: Nick More    Patient : 1950   MRN: [id number]    Reason for Admission: Bradycardia ...  Discharge Date: 25  RURS: Readmission Risk Score: 7      Last Discharge Facility       Date Complaint Diagnosis Description Type Department Provider    25 Irregular Heart Beat Bradycardia ... ED to Hosp-Admission (Discharged) (ADMITTED) [facility] Morgan, Harsh ARNETT MD; Justin Barrera...            Was this an external facility discharge?     Additional needs identified to be addressed with provider   High priority:    *[facility] Hospital Follow-up    Discharge date:     Discharge hospital: [facility]    Diagnosis: bradycardia    Initial appointment date offered:   Reason patient wasn't scheduled:     Patient needs: HFU    *Please schedule within 7 days of discharge and contact the patient.             Method of communication with provider: staff message.    Patients top risk factors for readmission: functional physical ability and medication management    Interventions to address risk factors:   Education:   KATIE PATIENTS:  If you notice any sign or symptom that indicates that you may be having a stroke, activate the  emergency medical services immediately by calling . These symptoms include: uneven  facial expression, arm(s) drifting down, strange speech or loss of speech, vision problems, sudden  severe headache, sudden numbness or face/arms/legs, sudden confusion or difficult  understanding, sudden dizziness, sudden difficulty with walking. Continue or begin taking the  medications prescribed by your physician as

## 2025-06-06 ENCOUNTER — TELEPHONE (OUTPATIENT)
Age: 75
End: 2025-06-06

## 2025-06-06 NOTE — TELEPHONE ENCOUNTER
Patient d/c from hosp 6/4  Post hosp apt is scheduled on 6/9 with Dr VELARDE    Needs to have TCM phone call

## 2025-06-06 NOTE — TELEPHONE ENCOUNTER
Care Transitions Initial Follow Up Call    Outreach made within 2 business days of discharge: Yes    Patient: Nick More Patient : 1950   MRN: [id number]  Reason for Admission: Bradycardia   Discharge Date: 25       Spoke with: left voicemail to call back     Discharge department/facility: [facility]    Left voicemail to call us back     Scheduled appointment with PCP within 7-14 days    Follow Up  Future Appointments   Date Time Provider Department Center   2025  4:20 PM Coleman Rincon MD [place] [facility] DEP   2025  8:00 AM Harsh Mora MD [place] [facility] DEP   5/15/2026  8:00 AM [facility]       Niesha Trimble MA

## 2025-06-06 NOTE — TELEPHONE ENCOUNTER
Care Transitions Initial Follow Up Call    Outreach made within 2 business days of discharge: Yes    Patient: Nick More Patient : 1950   MRN: [id number]  Reason for Admission: bradycardia   Discharge Date: 25       Spoke with: patient     Discharge department/facility: [facility] Patient Contact:  Was patient able to fill all prescriptions: Yes  Was patient instructed to bring all medications to the follow-up visit: Yes  Is patient taking all medications as directed in the discharge summary? Yes  Does patient understand their discharge instructions: Yes  Does patient have questions or concerns that need addressed prior to 7-14 day follow up office visit: no    Additional needs identified to be addressed with provider  No needs identified             Scheduled appointment with PCP within 7-14 days    Follow Up  Future Appointments   Date Time Provider Department Center   2025  4:20 PM [facility]   2025  8:00 AM Harsh Mora MD [place] [facility]   5/15/2026  8:00 AM Harsh Mora MD [facility]       Niesha Trimble MA

## 2025-06-09 ENCOUNTER — OFFICE VISIT (OUTPATIENT)
Age: 75
End: 2025-06-09

## 2025-06-09 VITALS
HEART RATE: 35 BPM | TEMPERATURE: 98.2 F | DIASTOLIC BLOOD PRESSURE: 62 MMHG | OXYGEN SATURATION: 96 % | SYSTOLIC BLOOD PRESSURE: 134 MMHG | BODY MASS INDEX: 28.96 KG/M2 | WEIGHT: 179.4 LBS

## 2025-06-09 DIAGNOSIS — Z09 HOSPITAL DISCHARGE FOLLOW-UP: ICD-10-CM

## 2025-06-09 DIAGNOSIS — Z95.5 HISTORY OF HEART ARTERY STENT: Chronic | ICD-10-CM

## 2025-06-09 DIAGNOSIS — E11.65 CONTROLLED TYPE 2 DIABETES MELLITUS WITH HYPERGLYCEMIA, WITHOUT LONG-TERM CURRENT USE OF INSULIN (HCC): ICD-10-CM

## 2025-06-09 DIAGNOSIS — I10 PRIMARY HYPERTENSION: Primary | ICD-10-CM

## 2025-06-09 DIAGNOSIS — R00.1 BRADYCARDIA: ICD-10-CM

## 2025-06-09 NOTE — PATIENT INSTRUCTIONS
Nick    Thank you for choosing [facility].  We know you have options when it comes to your healthcare; we appreciate that you chose us. Our goal is to provide exceptional  service and world class care to every patient.  You will be receiving a survey via email or text message asking for your feedback.  Please take a few minutes to share your thoughts about your recent visit. Your comments help us understand what we do well and ways we can improve.  Thank you in advance for your valuable feedback.      Dr. Sergey Segura, CMA

## 2025-06-09 NOTE — PROGRESS NOTES
[facility]  [facility]  [address]  [place] [address]  Dept: [phone]     Date of Visit:  2025  Patient Name: Nick More   Patient :  1950     CHIEF COMPLAINT/HPI:     Chief Complaint   Patient presents with    Follow-Up from Hospital     Admin  discharge  summary in chart. Labs, Imaging, and cardio testing done.         HPI      Nick More, 74 y.o. presents today for a TCM.     Nick More denies headaches, dizziness, chest pain, shortness of breath, heartburn/indigestion, nausea, vomiting, diarrhea, constipation, blood in stool, claudication, edema, skin changes, vision changes, fever, or chills.       REVIEW OF SYSTEM      Review of Systems:   Constitutional:  Negative for chills, fatigue, fever and unexpected weight change.   Eyes:  Negative for visual disturbance.   Respiratory:  Negative for cough, chest tightness, shortness of breath and wheezing.    Cardiovascular:  Negative for chest pain, palpitations and leg swelling.   Gastrointestinal:  Negative for abdominal distention, abdominal pain, blood in stool, constipation, diarrhea, nausea and vomiting.   Genitourinary:  Negative for dysuria, hematuria and urgency.   Musculoskeletal:  Negative for back pain, neck pain and neck stiffness.   Skin:  Negative for rash and wound.   Neurological:  Negative for syncope, weakness, light-headedness and headaches.   Hematological:  Negative for adenopathy. Does not bruise/bleed easily.   Psychiatric/Behavioral:  Negative for suicidal ideas. The patient is not nervous/anxious.      REVIEWED INFORMATION      Allergies   Allergen Reactions    No Known Allergies        Current Outpatient Medications   Medication Sig Dispense Refill    metFORMIN (GLUCOPHAGE) 1000 MG tablet Take 1 tablet by mouth 2 times daily Hold metformin for the next 2 days, then restart it as before 1 tablet twice daily 180 tablet 2    budesonide

## 2025-06-12 ENCOUNTER — CARE COORDINATION (OUTPATIENT)
Dept: CARE COORDINATION | Age: 75
End: 2025-06-12

## 2025-06-12 NOTE — CARE COORDINATION
Care Transitions Note    Follow Up Call     Attempted to reach patient for transitions of care follow up.  Unable to reach patient.      Outreach Attempts:   HIPAA compliant voicemail left for patient.     Care Summary Note:     Follow Up Appointment:   Future Appointments         Provider Specialty Dept Phone    11/13/2025 8:00 AM Harsh Mora MD Primary Care [phone]    5/15/2026 8:00 AM Harsh Mora MD Primary [facility] [phone]            Plan for follow-up on next business day.  based on severity of symptoms and risk factors. Plan for next call:  2nd attempt sub call    Maggie Persaud RN

## 2025-06-13 ENCOUNTER — CARE COORDINATION (OUTPATIENT)
Dept: CARE COORDINATION | Age: 75
End: 2025-06-13

## 2025-06-13 NOTE — CARE COORDINATION
Coordinator provided contact information.  Plan for follow-up call in 6-10 days based on severity of symptoms and risk factors.  Plan for next call: symptom management-any bradycardia?  follow-up appointment-date of cardiology appt? Did he get holter results?      Luisana Loredo LPN

## 2025-06-14 NOTE — PROGRESS NOTES
Patient came in today for a blood pressure and pulse check. Patient was feeling good today. Recently stopped coreg 3.125 mg per MB about 10 days ago. Patient blood pressure this morning was 142/62 and pulse was 30. Took his pulse twice and it was the same at 30. Dr Rincon went in the room to see the patient. Dr. Rincon instructed the patient to go to [facility] to get checked out.    Patient had EKG done here back on 05/20.   
   Hypertension     RAMBO (obstructive sleep apnea)        Past Surgical History:   Procedure Laterality Date    CARDIAC PROCEDURE N/A 6/4/2025    Left heart cath / coronary angiography performed by Neftaly Trinidad MD at [facility] Cardiac Cath/IR    CORONARY ANGIOPLASTY WITH STENT PLACEMENT      CYST REMOVAL      KNEE SURGERY          Social History     Socioeconomic History    Marital status:    Tobacco Use    Smoking status: Never    Smokeless tobacco: Never   Substance and Sexual Activity    Alcohol use: No    Sexual activity: Not Currently     Social Drivers of Health     Financial Resource Strain: Low Risk  (10/16/2024)    Overall Financial Resource Strain (CARDIA)     Difficulty of Paying Living Expenses: Not hard at all   Food Insecurity: No Food Insecurity (6/2/2025)    Hunger Vital Sign     Worried About Running Out of Food in the Last Year: Never true     Ran Out of Food in the Last Year: Never true   Transportation Needs: No Transportation Needs (6/2/2025)    PRAPARE - Transportation     Lack of Transportation (Medical): No     Lack of Transportation (Non-Medical): No   Physical Activity: Sufficiently Active (5/12/2025)    Exercise Vital Sign     Days of Exercise per Week: 7 days     Minutes of Exercise per Session: 60 min   Housing Stability: Low Risk  (6/2/2025)    Housing Stability Vital Sign     Unable to Pay for Housing in the Last Year: No     Number of Times Moved in the Last Year: 0     Homeless in the Last Year: No        PHYSICAL EXAM     BP (!) 142/62   Pulse (!) 30   SpO2 96%      General:A & O x3, No acute distress  HEENT:  NC/AT, PERRL, EOMI, TM clear bilaterally, no pharyngeal erythema, no mass palpated on neck exam  Lymph Nodes:  There is no lymphadenopathy in the cervical, supraclavicular, infraclavicular  Heart: S1+S2, no murmurs, RRR, no carotid bruits  Lungs: clear to auscultation without wheezes or rales  Abdomen: soft, nontender, no guarding, no rebound, no masses,

## 2025-06-20 ENCOUNTER — CARE COORDINATION (OUTPATIENT)
Dept: CARE COORDINATION | Age: 75
End: 2025-06-20

## 2025-06-20 LAB — ECHO BSA: 1.97 M2

## 2025-06-20 NOTE — CARE COORDINATION
Care Transitions Note    Follow Up Call     Attempted to reach patient for transitions of care follow up.  Unable to reach patient.      Outreach Attempts:   HIPAA compliant voicemail left for patient.     Care Summary Note: 1st attempt.    Follow Up Appointment:   Future Appointments         Provider Specialty Dept Phone    11/13/2025 8:00 AM Harsh Mora MD Primary Care [phone]    5/15/2026 8:00 AM Harsh Mora MD Primary Care [phone]            Plan for follow-up call in 2-5 days based on severity of symptoms and risk factors. Plan for next call:  symptom management-any bradycardia?  follow-up appointment-date of cardiology appt? Did he get holter results?      Luisana Loredo LPN        January 20, 2020    Madhav Cortez  [address] [place] [address]             [facility]  [address] [address] Dear Mr. Cortez,    My name is Waldemar and I work with [facility]'s Outpatient Case Management Department. We received a referral to call you to discuss your medical history. These services are free of charge and are offered to [facility] patients who have recently been discharged from any of our facilities or who have complex medical conditions that may require the skill of a nurse to assist with management.             We spoke on January 16th and I have since attempted to reach you by telephone, but I was unsuccessful. Please call our department so that we can go over some questions with you regarding your health.    The Outpatient Case Management Department can be reached at [phone] from 8:00AM to 4:30 PM on Monday thru Friday. [facility] also has a program where a nurse is available 24/7 to answer questions or provide medical advice, their number is [phone].    Thank You,      Waldemar Romo, RN   Outpatient Care Management  [phone]

## 2025-06-25 ENCOUNTER — CARE COORDINATION (OUTPATIENT)
Dept: CARE COORDINATION | Age: 75
End: 2025-06-25

## 2025-06-25 NOTE — CARE COORDINATION
Care Transitions Note    Follow Up Call     Attempted to reach patient for transitions of care follow up.  Unable to reach patient.      Outreach Attempts:   Multiple attempts to contact patient at phone numbers on file.   HIPAA compliant voicemail left for patient.     Care Summary Note: 2nd attempt.     Follow Up Appointment:   Future Appointments         Provider Specialty Dept Phone    11/13/2025 8:00 AM Harsh Mora MD Primary Care [phone]    5/15/2026 8:00 AM Harsh Mora MD Primary [facility] [phone]            No further follow-up call indicated based on severity of symptoms and risk factors.     Luisana Loredo LPN

## (undated) DEVICE — CATHETER DIAG AD 6FR L100CM COR GRN HYDRPHLC NYL JR 4 W/O

## (undated) DEVICE — GUIDEWIRE VASC J 3 MM 0.035 INX210 CM FIX COR INQWIRE

## (undated) DEVICE — BAND COMPR L24CM REG CLR PLAS HEMSTAT EXT HK AND LOOP RETEN

## (undated) DEVICE — GLIDESHEATH SLENDER STAINLESS STEEL KIT: Brand: GLIDESHEATH SLENDER

## (undated) DEVICE — CATHETER ANGIO JL3.5 0.056 INX6 FRX100 CM THRULUMEN EXPO

## (undated) DEVICE — STRAP ARMBRD W1.5XL32IN FOAM STR YET SFT W/ HK AND LOOP

## (undated) DEVICE — TRAY SURG CUST CRD CATH TOLEDO